# Patient Record
Sex: FEMALE | Race: BLACK OR AFRICAN AMERICAN | NOT HISPANIC OR LATINO | Employment: UNEMPLOYED | URBAN - METROPOLITAN AREA
[De-identification: names, ages, dates, MRNs, and addresses within clinical notes are randomized per-mention and may not be internally consistent; named-entity substitution may affect disease eponyms.]

---

## 2018-03-09 ENCOUNTER — CONVERSION ENCOUNTER (OUTPATIENT)
Dept: ORTHOPEDIC SURGERY | Facility: CLINIC | Age: 58
End: 2018-03-09

## 2018-03-15 ENCOUNTER — OFFICE VISIT CONVERTED (OUTPATIENT)
Dept: ORTHOPEDIC SURGERY | Facility: CLINIC | Age: 58
End: 2018-03-15
Attending: PHYSICIAN ASSISTANT

## 2018-04-09 ENCOUNTER — CONVERSION ENCOUNTER (OUTPATIENT)
Dept: ORTHOPEDIC SURGERY | Facility: CLINIC | Age: 58
End: 2018-04-09

## 2018-04-09 ENCOUNTER — OFFICE VISIT CONVERTED (OUTPATIENT)
Dept: ORTHOPEDIC SURGERY | Facility: CLINIC | Age: 58
End: 2018-04-09
Attending: PHYSICIAN ASSISTANT

## 2018-05-08 ENCOUNTER — OFFICE VISIT CONVERTED (OUTPATIENT)
Dept: ORTHOPEDIC SURGERY | Facility: CLINIC | Age: 58
End: 2018-05-08
Attending: PHYSICIAN ASSISTANT

## 2018-06-06 ENCOUNTER — OFFICE VISIT CONVERTED (OUTPATIENT)
Dept: ORTHOPEDIC SURGERY | Facility: CLINIC | Age: 58
End: 2018-06-06
Attending: ORTHOPAEDIC SURGERY

## 2018-07-26 ENCOUNTER — OFFICE VISIT CONVERTED (OUTPATIENT)
Dept: ORTHOPEDIC SURGERY | Facility: CLINIC | Age: 58
End: 2018-07-26
Attending: ORTHOPAEDIC SURGERY

## 2018-08-21 ENCOUNTER — OFFICE VISIT CONVERTED (OUTPATIENT)
Dept: ORTHOPEDIC SURGERY | Facility: CLINIC | Age: 58
End: 2018-08-21
Attending: ORTHOPAEDIC SURGERY

## 2018-09-25 ENCOUNTER — OFFICE VISIT CONVERTED (OUTPATIENT)
Dept: ORTHOPEDIC SURGERY | Facility: CLINIC | Age: 58
End: 2018-09-25
Attending: ORTHOPAEDIC SURGERY

## 2018-10-09 ENCOUNTER — OFFICE VISIT CONVERTED (OUTPATIENT)
Dept: ORTHOPEDIC SURGERY | Facility: CLINIC | Age: 58
End: 2018-10-09
Attending: ORTHOPAEDIC SURGERY

## 2018-10-30 ENCOUNTER — OFFICE VISIT CONVERTED (OUTPATIENT)
Dept: ORTHOPEDIC SURGERY | Facility: CLINIC | Age: 58
End: 2018-10-30
Attending: ORTHOPAEDIC SURGERY

## 2019-01-10 ENCOUNTER — OFFICE VISIT CONVERTED (OUTPATIENT)
Dept: ORTHOPEDIC SURGERY | Facility: CLINIC | Age: 59
End: 2019-01-10
Attending: ORTHOPAEDIC SURGERY

## 2019-08-06 ENCOUNTER — HOSPITAL ENCOUNTER (OUTPATIENT)
Dept: GENERAL RADIOLOGY | Facility: HOSPITAL | Age: 59
Discharge: HOME | End: 2019-08-06
Attending: FAMILY MEDICINE

## 2020-01-13 ENCOUNTER — HOSPITAL ENCOUNTER (OUTPATIENT)
Dept: NUCLEAR MEDICINE | Facility: HOSPITAL | Age: 60
Discharge: HOME | End: 2020-01-13
Attending: ORTHOPAEDIC SURGERY

## 2020-06-15 ENCOUNTER — HOSPITAL ENCOUNTER (OUTPATIENT)
Dept: PHYSICAL THERAPY | Facility: CLINIC | Age: 60
Setting detail: RECURRING SERIES
Discharge: STILL ADMITTED OR ADMINISTRATIVE | End: 2020-10-29
Attending: ORTHOPAEDIC SURGERY

## 2020-11-03 ENCOUNTER — HOSPITAL ENCOUNTER (OUTPATIENT)
Dept: PHYSICAL THERAPY | Facility: CLINIC | Age: 60
Setting detail: RECURRING SERIES
Discharge: STILL ADMITTED OR ADMINISTRATIVE | End: 2021-02-04
Attending: ORTHOPAEDIC SURGERY

## 2021-02-17 ENCOUNTER — HOSPITAL ENCOUNTER (OUTPATIENT)
Dept: PHYSICAL THERAPY | Facility: CLINIC | Age: 61
Setting detail: RECURRING SERIES
Discharge: HOME | End: 2021-03-16
Attending: ORTHOPAEDIC SURGERY

## 2021-05-16 VITALS
HEIGHT: 63 IN | BODY MASS INDEX: 34.55 KG/M2 | BODY MASS INDEX: 34.73 KG/M2 | BODY MASS INDEX: 34.73 KG/M2 | WEIGHT: 196 LBS | RESPIRATION RATE: 16 BRPM | WEIGHT: 196 LBS | HEIGHT: 63 IN | WEIGHT: 195 LBS | HEIGHT: 63 IN | RESPIRATION RATE: 16 BRPM | RESPIRATION RATE: 16 BRPM

## 2021-05-16 VITALS — RESPIRATION RATE: 16 BRPM | HEIGHT: 63 IN | WEIGHT: 196 LBS | BODY MASS INDEX: 34.73 KG/M2

## 2021-05-16 VITALS — BODY MASS INDEX: 34.73 KG/M2 | RESPIRATION RATE: 16 BRPM | WEIGHT: 196 LBS | HEIGHT: 63 IN

## 2021-05-16 VITALS — RESPIRATION RATE: 16 BRPM | BODY MASS INDEX: 34.73 KG/M2 | WEIGHT: 196 LBS | HEIGHT: 63 IN

## 2021-05-16 VITALS — BODY MASS INDEX: 34.73 KG/M2 | HEART RATE: 16 BPM | HEIGHT: 63 IN | WEIGHT: 196 LBS

## 2021-05-16 VITALS — BODY MASS INDEX: 34.73 KG/M2 | RESPIRATION RATE: 18 BRPM | HEIGHT: 63 IN | WEIGHT: 196 LBS

## 2021-05-16 VITALS — RESPIRATION RATE: 18 BRPM | HEIGHT: 63 IN | WEIGHT: 196 LBS | BODY MASS INDEX: 34.73 KG/M2

## 2021-11-02 ENCOUNTER — TRANSCRIBE ORDERS (OUTPATIENT)
Dept: PHYSICAL THERAPY | Facility: CLINIC | Age: 61
End: 2021-11-02

## 2021-11-02 DIAGNOSIS — Z96.611 HISTORY OF REVISION OF TOTAL REPLACEMENT OF RIGHT SHOULDER JOINT: Primary | ICD-10-CM

## 2021-11-05 ENCOUNTER — TREATMENT (OUTPATIENT)
Dept: PHYSICAL THERAPY | Facility: CLINIC | Age: 61
End: 2021-11-05

## 2021-11-05 ENCOUNTER — TRANSCRIBE ORDERS (OUTPATIENT)
Dept: PHYSICAL THERAPY | Facility: CLINIC | Age: 61
End: 2021-11-05

## 2021-11-05 DIAGNOSIS — Z96.651 STATUS POST REVISION OF TOTAL KNEE REPLACEMENT, RIGHT: Primary | ICD-10-CM

## 2021-11-05 DIAGNOSIS — R26.9 GAIT DISTURBANCE: ICD-10-CM

## 2021-11-05 DIAGNOSIS — M25.661 KNEE STIFFNESS, RIGHT: ICD-10-CM

## 2021-11-05 DIAGNOSIS — Z96.651 S/P REVISION OF TOTAL KNEE, RIGHT: Primary | ICD-10-CM

## 2021-11-05 PROCEDURE — 97162 PT EVAL MOD COMPLEX 30 MIN: CPT | Performed by: PHYSICAL THERAPIST

## 2021-11-05 PROCEDURE — 97140 MANUAL THERAPY 1/> REGIONS: CPT | Performed by: PHYSICAL THERAPIST

## 2021-11-05 PROCEDURE — 97110 THERAPEUTIC EXERCISES: CPT | Performed by: PHYSICAL THERAPIST

## 2021-11-05 NOTE — PROGRESS NOTES
Physical Therapy Initial Evaluation and Plan of Care    Patient: Jaimee Quesada   : 1960  Diagnosis/ICD-10 Code:  S/P revision of total knee, right [Z96.651]  Referring practitioner: Primo Ochoa/Wilfredo Dickerson  Date of Initial Visit: 2021  Today's Date: 2021  Patient seen for 1 sessions           Subjective Questionnaire: LEFS: 11%      Subjective Evaluation    History of Present Illness  Mechanism of injury: Pt is s/p Right total knee revision 10/27/21, she has a CPM at home. Pt has a flight of stairs at home. Ambulating with the walker currently. Pt has been seen in therapy previously for the first total knee.     Pain  Current pain ratin  At best pain ratin  At worst pain ratin  Quality: tight, dull ache and discomfort  Relieving factors: change in position, ice and rest  Aggravating factors: ambulation, movement and standing    Social Support  Lives in: multiple-level home  Lives with: alone    Treatments  Previous treatment: physical therapy  Patient Goals  Patient goals for therapy: decreased edema, decreased pain, improved balance, increased motion, increased strength and independence with ADLs/IADLs             Objective          Active Range of Motion     Right Knee   Flexion: 80 degrees   Extension: Right knee active extension: -10.     Passive Range of Motion     Right Knee   Flexion: 87 degrees   Extension: Right knee passive extension: -8.     Strength/Myotome Testing     Left Knee   Flexion: 5  Extension: 5    Right Knee   Flexion: 3+  Extension: 3+    Ambulation     Comments   Antalgic on RLE with decreased flexion and extension throughout gait      See Exercise, Manual, and Modality Logs for complete treatment.     Assessment & Plan     Assessment  Impairments: abnormal gait, abnormal muscle firing, abnormal or restricted ROM, activity intolerance, impaired balance, impaired physical strength, pain with function and weight-bearing intolerance  Assessment details:  The patient presents to physical therapy with complaints of right knee pain s/p Right total knee revision, still presents with severe stiffness in both flexion and extension. The patient presents with associated knee weakness, stiffness, antalgic gait, and functional deficits (LEFS). The patient would benefit from skilled PT intervention to address the above mentioned functional limitations.     Prognosis: good  Functional Limitations: walking, standing and stooping  Goals  Plan Goals: KNEE PROBLEMS    1. The patient has limited ROM of the right knee.   LTG 1:12 weeks:  The patient will demonstrate 5 to 110 degrees of ROM for the right knee in order to allow patient to ambulate.    STATUS:  New   STG 1a: 6 weeks:  The patient will demonstrate 8 to 10 degrees of ROM for the right knee.    STATUS:  New   TREATMENT: Manual therapy, therapeutic exercise, home exercise instruction, and modalities as needed to include:  moist heat, electrical stimulation, ultrasound, and ice.    2. The patient has limited strength of the right knee.   LTG 2: 12 weeks: The patient will demonstrate 4+ /5 strength for knee flexion and extension in order to allow patient improved joint stability    STATUS:  New   STG 2a: 6 weeks: The patient will demonstrate 4 /5 strength for knee flexion and extension    STATUS:  New    TREATMENT: Manual therapy, therapeutic exercise, home exercise instruction, aquatic therapy, and modalities as needed to include:  moist heat, electrical stimulation, ultrasound, and ice.     3. The patient has gait dysfunction.   LTG 3: 12 weeks:  The patient will ambulate without assistive device, independently, for community distances with normal biomechanics of the right lower extremity in order to improve mobility and allow patient to perform activities such as grocery shopping with greater ease.    STATUS:  New   STG 3a: 4 weeks: The patient will ambulate with a single tipped cane with appropriate gait  mechanics.    STATUS:  New   TREATMENT: Gait training, aquatic therapy, therapeutic exercise, and home exercise instruction.    4. Mobility: Walking/Moving Around Functional Limitation     LTG 4: 12 weeks:  The patient will demonstrate 25 % limitation by achieving a score of 60/80 on the LEFS.    STATUS:  New   STG 4 a: 6 weeks:  The patient will demonstrate 50 % limitation by achieving a score of 40/80 on the LEFS.      STATUS:  New   TREATMENT:  Manual therapy, therapeutic exercise, home exercise instruction, and modalities as needed to include: moist heat, electrical stimulation, and ultrasound.       Plan  Therapy options: will be seen for skilled physical therapy services  Planned modality interventions: TENS, cryotherapy and thermotherapy (hydrocollator packs)  Planned therapy interventions: functional ROM exercises, gait training, home exercise program, manual therapy, strengthening, stretching, therapeutic activities, soft tissue mobilization, joint mobilization, neuromuscular re-education, balance/weight-bearing training and transfer training  Other planned therapy interventions: aquatic therapy  Frequency: 3x week  Duration in weeks: 12  Treatment plan discussed with: patient        Visit Diagnoses:    ICD-10-CM ICD-9-CM   1. 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 Knee stiffness, right  M25.661 719.56   3. Gait disturbance  R26.9 781.2       History # of Personal Factors and/or Comorbidities: HIGH (3+)  Examination of Body System(s): # of elements: MODERATE (3)  Clinical Presentation: STABLE   Clinical Decision Making: MODERATE      Timed:         Manual Therapy:    10     mins  80211;     Therapeutic Exercise:    15     mins  31308;     Neuromuscular Joss:    0    mins  97570;    Therapeutic Activity:     0     mins  85932;     Gait Trainin     mins  80548;     Ultrasound:     0     mins  36859;    Ionto                               0    mins   23553  Self Care                        0     mins   43692  Canalith Repos    0     mins 54300      Un-Timed:  Electrical Stimulation:    0     mins  11420 ( );  Dry Needling     0     mins self-pay  Traction     0     mins 12719  Low Eval     0     Mins  58958  Mod Eval     25     Mins  86273  High Eval                       0     Mins  88210  Re-Eval                           0    mins  53728    Timed Treatment:   25   mins   Total Treatment:     50   mins    PT SIGNATURE: Kurt King PT     Electronically signed 11/5/2021    [place] License: PT - [id number]     Initial Certification  Certification Period: 11/5/2021 thru 2/2/2022  I certify that the therapy services are furnished while this patient is under my care.  The services outlined above are required by this patient, and will be reviewed every 90 days.     PHYSICIAN: Primo Stephen/MD Wilfredo      DATE:     Please sign and return via fax to [phone]. Thank you, [facility].

## 2021-11-09 ENCOUNTER — TREATMENT (OUTPATIENT)
Dept: PHYSICAL THERAPY | Facility: CLINIC | Age: 61
End: 2021-11-09

## 2021-11-09 DIAGNOSIS — M25.661 KNEE STIFFNESS, RIGHT: ICD-10-CM

## 2021-11-09 DIAGNOSIS — R26.9 GAIT DISTURBANCE: ICD-10-CM

## 2021-11-09 DIAGNOSIS — Z96.651 S/P REVISION OF TOTAL KNEE, RIGHT: Primary | ICD-10-CM

## 2021-11-09 PROCEDURE — 97110 THERAPEUTIC EXERCISES: CPT | Performed by: PHYSICAL THERAPIST

## 2021-11-09 PROCEDURE — 97530 THERAPEUTIC ACTIVITIES: CPT | Performed by: PHYSICAL THERAPIST

## 2021-11-09 NOTE — PROGRESS NOTES
Physical Therapy Daily Treatment Note      Patient: Jaimee Quesada   : 1960  Referring practitioner: Primo Ochoa/Wilfredo Arana*  Date of Initial Visit: Type: THERAPY  Noted: 2021  Today's Date: 2021  Patient seen for 2 sessions           Subjective Questionnaire:       Subjective Evaluation    History of Present Illness    Subjective comment: Pt presnts with RW and report of no pain currently.         Objective   See Exercise, Manual, and Modality Logs for complete treatment.       Assessment & Plan     Assessment  Assessment details: Pt's activee R knee flexion on sliding board measured 90 degrees.        Visit Diagnoses:    ICD-10-CM ICD-9-CM   1. 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 Knee stiffness, right  M25.661 719.56   3. Gait disturbance  R26.9 781.2       Progress per Plan of Care and Progress strengthening /stabilization /functional activity           Timed:  Manual Therapy:    12     mins  49087;  Therapeutic Exercise:    17     mins  75748;     Neuromuscular Joss:        mins  00415;    Therapeutic Activity:          mins  09089;     Gait Training:           mins  23605;     Ultrasound:          mins  40450;    Electrical Stimulation:         mins  42626 ( );  Aquatic Therapy          mins  21625    Untimed:  Electrical Stimulation:         mins  31725 ( );  Mechanical Traction:         mins  58587;     Timed Treatment:   29   mins   Total Treatment:     29   mins    Electronically signed    Chanell South PTA  Physical Therapist Assistant    [facility] license: [id number]

## 2021-11-11 ENCOUNTER — TREATMENT (OUTPATIENT)
Dept: PHYSICAL THERAPY | Facility: CLINIC | Age: 61
End: 2021-11-11

## 2021-11-11 DIAGNOSIS — R26.9 GAIT DISTURBANCE: ICD-10-CM

## 2021-11-11 DIAGNOSIS — Z96.651 S/P REVISION OF TOTAL KNEE, RIGHT: Primary | ICD-10-CM

## 2021-11-11 DIAGNOSIS — M25.661 KNEE STIFFNESS, RIGHT: ICD-10-CM

## 2021-11-11 PROCEDURE — 97140 MANUAL THERAPY 1/> REGIONS: CPT | Performed by: PHYSICAL THERAPIST

## 2021-11-11 PROCEDURE — 97110 THERAPEUTIC EXERCISES: CPT | Performed by: PHYSICAL THERAPIST

## 2021-11-11 NOTE — PROGRESS NOTES
"Physical Therapy Daily Treatment Note      Patient: Jaimee Quesada   : 1960  Referring practitioner: Primo Ochoa/Wilfredo Arana*  Date of Initial Visit: Type: THERAPY  Noted: 2021  Today's Date: 2021  Patient seen for 3 sessions           Subjective  Jaimee Quesada reports: \"it is not really pain, it just hurts, soreness.\" \"Got the staples out yesterday.\" \"I have the leg machine that keeps it moving. Doctor said at 60 then go to 90. I was doing it for 2 hours but he told me to go to 1 hr, twice a day.\"       Objective   Pt arrived with STC in R hand  R knee flexion 100-105 hooklying; R knee extension 18-20 hooklying.    See Exercise, Manual, and Modality Logs for complete treatment.       Assessment/Plan  \"My leg feels looser, not as stiff,\" after treatment. Pt requires further skilled care to attend to deficits in her AROM, strength and gross functional ability.  Visit Diagnoses:    ICD-10-CM ICD-9-CM   1. 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 Knee stiffness, right  M25.661 719.56   3. Gait disturbance  R26.9 781.2       Progress per Plan of Care           Timed:  Manual Therapy:    12     mins  74755;  Therapeutic Exercise:    20     mins  01523;     Neuromuscular Joss:        mins  37195;    Therapeutic Activity:          mins  83346;     Gait Trainin     mins  89681;     Ultrasound:          mins  67374;    Electrical Stimulation:         mins  29372 ( );  Aquatics  __   mins   20987    Untimed:  Electrical Stimulation:         mins  43071 ( );  Mechanical Traction:         mins  67930;     Timed Treatment:   40   mins   Total Treatment:     40   mins    Electronically Signed:  Deb Kelly PTA  Physical Therapist Assistant    [place] PTA license [id number]            "

## 2021-11-15 ENCOUNTER — TREATMENT (OUTPATIENT)
Dept: PHYSICAL THERAPY | Facility: CLINIC | Age: 61
End: 2021-11-15

## 2021-11-15 DIAGNOSIS — Z96.651 S/P REVISION OF TOTAL KNEE, RIGHT: Primary | ICD-10-CM

## 2021-11-15 DIAGNOSIS — R26.9 GAIT DISTURBANCE: ICD-10-CM

## 2021-11-15 DIAGNOSIS — M25.661 KNEE STIFFNESS, RIGHT: ICD-10-CM

## 2021-11-15 PROCEDURE — 97110 THERAPEUTIC EXERCISES: CPT | Performed by: PHYSICAL THERAPIST

## 2021-11-15 PROCEDURE — 97140 MANUAL THERAPY 1/> REGIONS: CPT | Performed by: PHYSICAL THERAPIST

## 2021-11-15 NOTE — PROGRESS NOTES
"Physical Therapy Daily Treatment Note      Patient: Jaimee Quesada   : 1960  Referring practitioner: Primo Ochoa/Wilfredo Arana*  Date of Initial Visit: Type: THERAPY  Noted: 2021  Today's Date: 11/15/2021  Patient seen for 4 sessions           Subjective  Jaimee Quesada reports: she is not sleeping well. She produced her bottle that holds medication for muscle spasms, \"just in case I need it while I am here.\" Jaimee also spoke about difficulty getting her diabetes strips.       Objective   Knee Flexion: 100 deg actively after vigorous stretching passively  Knee Extension: 13 deg     See Exercise, Manual, and Modality Logs for complete treatment.       Assessment/Plan   Reviewed quad sets and need for pts compliance with HEP. She is up to 90 degrees flexion with CPM. Skilled care remains needed for flexibility, strength and functional ability.      Visit Diagnoses:    ICD-10-CM ICD-9-CM   1. 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 Knee stiffness, right  M25.661 719.56   3. Gait disturbance  R26.9 781.2       Progress per Plan of Care and Progress strengthening /stabilization /functional activity           Timed:  Manual Therapy:  12       mins  75344;  Therapeutic Exercise:    20     mins  17838;     Neuromuscular Joss:        mins  70383;    Therapeutic Activity:          mins  51384;     Gait Trainin     mins  36870;     Ultrasound:          mins  09266;    Electrical Stimulation:         mins  05162 ( );  Aquatics  __   mins   40951    Untimed:  Electrical Stimulation:         mins  70044 ( );  Mechanical Traction:         mins  82637;     Timed Treatment:   40   mins   Total Treatment:     40   mins    Electronically signed  Deb Kelly PTA  Physical Therapist Assistant  [place] PTA license  [id number]  "

## 2021-11-19 ENCOUNTER — TREATMENT (OUTPATIENT)
Dept: PHYSICAL THERAPY | Facility: CLINIC | Age: 61
End: 2021-11-19

## 2021-11-19 DIAGNOSIS — M25.661 KNEE STIFFNESS, RIGHT: ICD-10-CM

## 2021-11-19 DIAGNOSIS — R26.9 GAIT DISTURBANCE: ICD-10-CM

## 2021-11-19 DIAGNOSIS — Z96.651 S/P REVISION OF TOTAL KNEE, RIGHT: Primary | ICD-10-CM

## 2021-11-19 PROCEDURE — 97110 THERAPEUTIC EXERCISES: CPT | Performed by: PHYSICAL THERAPIST

## 2021-11-19 PROCEDURE — 97530 THERAPEUTIC ACTIVITIES: CPT | Performed by: PHYSICAL THERAPIST

## 2021-11-19 PROCEDURE — 97140 MANUAL THERAPY 1/> REGIONS: CPT | Performed by: PHYSICAL THERAPIST

## 2021-11-19 NOTE — PROGRESS NOTES
Physical Therapy Daily Progress Note    Patient: Jaimee Quesada   : 1960  Diagnosis/ICD-10 Code:  S/P revision of total knee, right [Z96.651]  Referring practitioner: Primo Ochoa/Wilfredo Arana*  Date of Initial Visit: Type: THERAPY  Noted: 2021  Today's Date: 2021  Patient seen for 5 sessions           Subjective Evaluation    History of Present Illness    Subjective comment: Pt reporting she is stiff, but feels like it is improving much better than the last surgery. Pain  Current pain rating: 3           Objective   See Exercise, Manual, and Modality Logs for complete treatment.       Assessment & Plan     Assessment  Assessment details: Pt tolerated today's session well with progression of functional strengthening and ROM. Pt would benefit from continued skilled therapy to address deficits. Progress per plan of care.                 Manual Therapy:    10     mins  80160;  Therapeutic Exercise:    15     mins  35334;     Neuromuscular Joss:    0    mins  00643;    Therapeutic Activity:     10     mins  27922;     Gait Trainin     mins  09874;     Ultrasound:     0     mins  24335;    Electrical Stimulation:    0     mins  66283 ( );  Dry Needling     0     mins self-pay;  Aquatic Therapy    0     mins  51430;  Mechanical Traction    0     mins  95729  Moist Heat     0     mins  No charge    Timed Treatment:   35   mins   Total Treatment:     35   mins    Kurt King PT  Physical Therapist    Electronically signed 2021    [place] License: PT - [id number]

## 2021-11-24 ENCOUNTER — TREATMENT (OUTPATIENT)
Dept: PHYSICAL THERAPY | Facility: CLINIC | Age: 61
End: 2021-11-24

## 2021-11-24 DIAGNOSIS — Z96.651 S/P REVISION OF TOTAL KNEE, RIGHT: Primary | ICD-10-CM

## 2021-11-24 DIAGNOSIS — R26.9 GAIT DISTURBANCE: ICD-10-CM

## 2021-11-24 DIAGNOSIS — M25.661 KNEE STIFFNESS, RIGHT: ICD-10-CM

## 2021-11-24 PROCEDURE — 97530 THERAPEUTIC ACTIVITIES: CPT | Performed by: PHYSICAL THERAPIST

## 2021-11-24 PROCEDURE — 97140 MANUAL THERAPY 1/> REGIONS: CPT | Performed by: PHYSICAL THERAPIST

## 2021-11-24 PROCEDURE — 97110 THERAPEUTIC EXERCISES: CPT | Performed by: PHYSICAL THERAPIST

## 2021-11-24 NOTE — PROGRESS NOTES
Physical Therapy Daily Treatment Note      Patient: Jaimee Quesada   : 1960  Referring practitioner: Primo Ochoa/Wilfredo Arana*  Date of Initial Visit: Type: THERAPY  Noted: 2021  Today's Date: 2021  Patient seen for 6 sessions           Subjective  Jaimee Quesada reports:  She is working hard at home but is still experiencing pain with her efforts.     Objective   Knee Flexion: 101 deg  Knee Extension: 14 deg     See Exercise, Manual, and Modality Logs for complete treatment.       Assessment/Plan    Visit Diagnoses:    ICD-10-CM ICD-9-CM   1. 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 Knee stiffness, right  M25.661 719.56   3. Gait disturbance  R26.9 781.2       Progress per Plan of Care and Progress strengthening /stabilization /functional activity           Timed:  Manual Therapy:   12      mins  25210;  Therapeutic Exercise:    15     mins  99441;     Neuromuscular Joss:        mins  92026;    Therapeutic Activity:     15     mins  48331;     Gait Training:           mins  18853;     Ultrasound:          mins  69774;    Electrical Stimulation:         mins  06167 ( );  Aquatics  __   mins   34786    Untimed:  Electrical Stimulation:         mins  86915 ( );  Mechanical Traction:         mins  21444;     Timed Treatment: 42     mins   Total Treatment:    42    mins    Electronically signed  Deb Kelly PTA  Physical Therapist Assistant  [facility] license  [id number]

## 2021-11-30 ENCOUNTER — TREATMENT (OUTPATIENT)
Dept: PHYSICAL THERAPY | Facility: CLINIC | Age: 61
End: 2021-11-30

## 2021-11-30 DIAGNOSIS — M25.661 KNEE STIFFNESS, RIGHT: ICD-10-CM

## 2021-11-30 DIAGNOSIS — Z96.651 S/P REVISION OF TOTAL KNEE, RIGHT: Primary | ICD-10-CM

## 2021-11-30 DIAGNOSIS — R26.9 GAIT DISTURBANCE: ICD-10-CM

## 2021-11-30 PROCEDURE — 97110 THERAPEUTIC EXERCISES: CPT | Performed by: PHYSICAL THERAPIST

## 2021-11-30 PROCEDURE — 97530 THERAPEUTIC ACTIVITIES: CPT | Performed by: PHYSICAL THERAPIST

## 2021-11-30 PROCEDURE — 97140 MANUAL THERAPY 1/> REGIONS: CPT | Performed by: PHYSICAL THERAPIST

## 2021-11-30 NOTE — PROGRESS NOTES
Physical Therapy Daily Progress Note    Patient: Jaimee Quesada   : 1960  Diagnosis/ICD-10 Code:  S/P revision of total knee, right [Z96.651]  Referring practitioner: Primo Ochoa/Wilfredo Arana*  Date of Initial Visit: Type: THERAPY  Noted: 2021  Today's Date: 2021  Patient seen for 7 sessions           Subjective Evaluation    History of Present Illness    Subjective comment: Pt reporting soreness over the inside part of her knee. Pain  Current pain ratin           Objective   See Exercise, Manual, and Modality Logs for complete treatment.       Assessment & Plan     Assessment    Assessment details: Pt tolerated today's session well with flexion improving, although still very limited with extension. Pt would benefit from continued skilled therapy to address deficits. Progress per plan of care.                 Manual Therapy:    10     mins  08047;  Therapeutic Exercise:    15     mins  60803;     Neuromuscular Joss:    0    mins  18620;    Therapeutic Activity:     10     mins  22953;     Gait Trainin     mins  90590;     Ultrasound:     0     mins  61065;    Electrical Stimulation:    0     mins  10635 ( );  Dry Needling     0     mins self-pay;  Aquatic Therapy    0     mins  35161;  Mechanical Traction    0     mins  96952  Moist Heat     0     mins  No charge    Timed Treatment:   35   mins   Total Treatment:     35   mins    Kurt King PT  Physical Therapist    Electronically signed 2021    [place] License: PT - [id number]

## 2021-12-02 ENCOUNTER — TREATMENT (OUTPATIENT)
Dept: PHYSICAL THERAPY | Facility: CLINIC | Age: 61
End: 2021-12-02

## 2021-12-02 DIAGNOSIS — M25.661 KNEE STIFFNESS, RIGHT: ICD-10-CM

## 2021-12-02 DIAGNOSIS — Z96.651 S/P REVISION OF TOTAL KNEE, RIGHT: Primary | ICD-10-CM

## 2021-12-02 DIAGNOSIS — R26.9 GAIT DISTURBANCE: ICD-10-CM

## 2021-12-02 PROCEDURE — 97530 THERAPEUTIC ACTIVITIES: CPT | Performed by: PHYSICAL THERAPIST

## 2021-12-02 PROCEDURE — 97110 THERAPEUTIC EXERCISES: CPT | Performed by: PHYSICAL THERAPIST

## 2021-12-02 PROCEDURE — 97140 MANUAL THERAPY 1/> REGIONS: CPT | Performed by: PHYSICAL THERAPIST

## 2021-12-02 NOTE — PROGRESS NOTES
Physical Therapy Daily Progress Note    Patient: Jaimee Quesada   : 1960  Diagnosis/ICD-10 Code:  S/P revision of total knee, right [Z96.651]  Referring practitioner: Primo Ochoa/Wilfredo Arana*  Date of Initial Visit: Type: THERAPY  Noted: 2021  Today's Date: 2021  Patient seen for 8 sessions           Subjective Evaluation    History of Present Illness    Subjective comment: Pt reporting she is much more sore and stiff today, she's been pretty sore since the last session. Pain is more over the inside of the knee. She knows it was helpful to stretch, but still very sore.Pain  Current pain ratin           Objective   See Exercise, Manual, and Modality Logs for complete treatment.       Assessment & Plan     Assessment    Assessment details: Pt tolerated today's session well, continued emphasis on ROM activities. Pt would benefit from continued skilled therapy to address deficits. Progress per plan of care.                 Manual Therapy:    10     mins  40025;  Therapeutic Exercise:    18     mins  94199;     Neuromuscular Joss:    0    mins  61599;    Therapeutic Activity:     10     mins  45942;     Gait Trainin     mins  61451;     Ultrasound:     0     mins  40483;    Electrical Stimulation:    0     mins  15914 ( );  Dry Needling     0     mins self-pay;  Aquatic Therapy    0     mins  30939;  Mechanical Traction    0     mins  60726  Moist Heat     0     mins  No charge    Timed Treatment:   38   mins   Total Treatment:     38   mins    Kurt King PT  Physical Therapist    Electronically signed 2021    [place] License: PT - [id number]

## 2021-12-07 ENCOUNTER — TREATMENT (OUTPATIENT)
Dept: PHYSICAL THERAPY | Facility: CLINIC | Age: 61
End: 2021-12-07

## 2021-12-07 DIAGNOSIS — M25.661 KNEE STIFFNESS, RIGHT: ICD-10-CM

## 2021-12-07 DIAGNOSIS — R26.9 GAIT DISTURBANCE: ICD-10-CM

## 2021-12-07 DIAGNOSIS — Z96.651 S/P REVISION OF TOTAL KNEE, RIGHT: Primary | ICD-10-CM

## 2021-12-07 PROCEDURE — 97110 THERAPEUTIC EXERCISES: CPT | Performed by: PHYSICAL THERAPIST

## 2021-12-07 PROCEDURE — 97140 MANUAL THERAPY 1/> REGIONS: CPT | Performed by: PHYSICAL THERAPIST

## 2021-12-07 NOTE — PROGRESS NOTES
"Physical Therapy Daily Treatment Note      Patient: Jaimee Quesada   : 1960  Referring practitioner: Primo Ochoa/Wilfredo Arana*  Date of Initial Visit: Type: THERAPY  Noted: 2021  Today's Date: 2021  Patient seen for 9 sessions           Subjective  Jaimee Quesada reports: pt stated she doesn't like the bike PTA put her on. \"Not pain but stiff. Not using CPM any longer.\"  \"See the doctor 21.\"    Objective   See Exercise, Manual, and Modality Logs for complete treatment.       Assessment/Plan  Jaimee continues to progress with AROM, strength and functional ability yet has further to go to attain LTG's. Therapist directed program remains necessary as outlined.    Visit Diagnoses:    ICD-10-CM ICD-9-CM   1. 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 Knee stiffness, right  M25.661 719.56   3. Gait disturbance  R26.9 781.2       Continue per POC.           Timed:  Manual Therapy:    10     mins  24327;  Therapeutic Exercise:    20     mins  18157;     Neuromuscular Joss:        mins  37064;    Therapeutic Activity:          mins  18356;     Gait Training:           mins  12268;     Ultrasound:          mins  23634;    Electrical Stimulation:         mins  00073 ( );  Aquatics  __   mins   78625    Untimed:  Electrical Stimulation:         mins  22953 ( );  Mechanical Traction:         mins  60756;     Timed Treatment:   30   mins   Total Treatment:     30   mins    Electronically Signed:  Deb Kelly PTA  Physical Therapist Assistant    [facility] license [id number]            "

## 2021-12-09 ENCOUNTER — TREATMENT (OUTPATIENT)
Dept: PHYSICAL THERAPY | Facility: CLINIC | Age: 61
End: 2021-12-09

## 2021-12-09 DIAGNOSIS — M25.661 KNEE STIFFNESS, RIGHT: ICD-10-CM

## 2021-12-09 DIAGNOSIS — Z96.651 S/P REVISION OF TOTAL KNEE, RIGHT: Primary | ICD-10-CM

## 2021-12-09 DIAGNOSIS — R26.9 GAIT DISTURBANCE: ICD-10-CM

## 2021-12-09 PROCEDURE — 97110 THERAPEUTIC EXERCISES: CPT | Performed by: PHYSICAL THERAPIST

## 2021-12-09 PROCEDURE — 97140 MANUAL THERAPY 1/> REGIONS: CPT | Performed by: PHYSICAL THERAPIST

## 2021-12-09 NOTE — PROGRESS NOTES
Progress Assessment        Patient: Jaimee Quesada   : 1960  Diagnosis/ICD-10 Code:  S/P revision of total knee, right [Z96.651]  Referring practitioner: Primo Ochoa/Wilfredo Arana*  Date of Initial Visit: Type: THERAPY  Noted: 2021  Today's Date: 2021  Patient seen for 10 sessions      Subjective:     Subjective Questionnaire: LEFS:   Clinical Progress: improved  Home Program Compliance: Yes  Treatment has included: therapeutic exercise, manual therapy and therapeutic activity    Subjective Evaluation    History of Present Illness  Mechanism of injury: Pt reporting she is still feeling very stiff. She just changed insulin medications and feels this is making her more fatigued in general. The weather, too, she says is making her more stiff. Although, she is still trying to stretch at home as well.     Pain  Current pain ratin         Objective   Active Range of Motion      Right Knee   Flexion: 98 degrees (compared to 80 degrees at eval)   Extension: Right knee active extension: -10.      Passive Range of Motion      Right Knee   Flexion: 110 degrees (compared to 87 degrees  At eval)  Extension: Right knee passive extension: -8.      Strength/Myotome Testing      Left Knee   Flexion: 5  Extension: 5     Right Knee   Flexion: 4  Extension: 4     Ambulation      Comments   Antalgic on RLE with decreased flexion and extension throughout gait      Assessment & Plan     Assessment  Impairments: abnormal gait, abnormal muscle firing, abnormal or restricted ROM, activity intolerance, impaired balance, impaired physical strength, pain with function and weight-bearing intolerance  Functional Limitations: walking, standing and stooping  Assessment details: The patient presents to physical therapy with complaints of right knee pain s/p Right total knee revision, still presents with severe stiffness in both flexion and extension, although she has progressed well with flexion.  She is currently  most limited with extension. She has met two additional goals set at the evaluation beyond the flexion ROM goals.  The patient presents with associated knee weakness, stiffness, antalgic gait, and functional deficits (LEFS). The patient would benefit from skilled PT intervention to address the above mentioned functional limitations.     Prognosis: good    Goals  Plan Goals: KNEE PROBLEMS    1. The patient has limited ROM of the right knee.   LTG 1:12 weeks:  The patient will demonstrate 5 to 110 degrees of ROM for the right knee in order to allow patient to ambulate.    STATUS:  MET for flexion, not for extension   STG 1a: 6 weeks:  The patient will demonstrate 8 to 100 degrees of ROM for the right knee.    STATUS:  MET for flexion, not for extension   TREATMENT: Manual therapy, therapeutic exercise, home exercise instruction, and modalities as needed to include:  moist heat, electrical stimulation, ultrasound, and ice.    2. The patient has limited strength of the right knee.   LTG 2: 12 weeks: The patient will demonstrate 4+ /5 strength for knee flexion and extension in order to allow patient improved joint stability    STATUS:  Not met   STG 2a: 6 weeks: The patient will demonstrate 4 /5 strength for knee flexion and extension    STATUS:  MET   TREATMENT: Manual therapy, therapeutic exercise, home exercise instruction, aquatic therapy, and modalities as needed to include:  moist heat, electrical stimulation, ultrasound, and ice.     3. The patient has gait dysfunction.   LTG 3: 12 weeks:  The patient will ambulate without assistive device, independently, for community distances with normal biomechanics of the right lower extremity in order to improve mobility and allow patient to perform activities such as grocery shopping with greater ease.    STATUS:  Not met   STG 3a: 4 weeks: The patient will ambulate with a single tipped cane with appropriate gait mechanics.    STATUS:  MET   TREATMENT: Gait training,  aquatic therapy, therapeutic exercise, and home exercise instruction.    4. Mobility: Walking/Moving Around Functional Limitation     LTG 4: 12 weeks:  The patient will demonstrate 25 % limitation by achieving a score of 60/80 on the LEFS.    STATUS:  Not met   STG 4 a: 6 weeks:  The patient will demonstrate 50 % limitation by achieving a score of 40/80 on the LEFS.      STATUS:  Not met   TREATMENT:  Manual therapy, therapeutic exercise, home exercise instruction, and modalities as needed to include: moist heat, electrical stimulation, and ultrasound.       Plan  Therapy options: will be seen for skilled therapy services  Planned modality interventions: TENS, cryotherapy and thermotherapy (hydrocollator packs)  Planned therapy interventions: functional ROM exercises, gait training, home exercise program, manual therapy, strengthening, stretching, therapeutic activities, soft tissue mobilization, joint mobilization, neuromuscular re-education, balance/weight-bearing training and transfer training  Other planned therapy interventions: aquatic therapy  Frequency: 3x week  Duration in weeks: 12  Treatment plan discussed with: patient      Progress toward previous goals: Partially Met    See Exercise, Manual, and Modality Logs for complete treatment.         Recommendations: Continue as planned  Timeframe: 2 months  Prognosis to achieve goals: good    PT Signature: Kurt King PT    Electronically signed 12/9/2021    [place] License: PT - [id number]     Based upon review of the patient's progress and continued therapy plan, it is my medical opinion that Jaimee Quesada should continue physical therapy treatment at [facility] PHYSICAL THERAPY  [address]  [place] [address] [phone].      Timed:         Manual Therapy:    10     mins  24128;     Therapeutic Exercise:    20     mins  52574;     Neuromuscular Joss:    0    mins  32356;    Therapeutic Activity:     0     mins  64402;      Gait Trainin     mins  71228;     Ultrasound:     0     mins  30377;    Ionto                               0    mins   03031  Self Care                       0     mins   59511  Aquatic                          0     mins 36921          Timed Treatment:   30   mins   Total Treatment:     30   mins      I certify that the therapy services are furnished while this patient is under my care.  The services outlined above are required by this patient, and will be reviewed every 90 days.

## 2021-12-14 ENCOUNTER — TREATMENT (OUTPATIENT)
Dept: PHYSICAL THERAPY | Facility: CLINIC | Age: 61
End: 2021-12-14

## 2021-12-14 DIAGNOSIS — M25.661 KNEE STIFFNESS, RIGHT: ICD-10-CM

## 2021-12-14 DIAGNOSIS — R26.9 GAIT DISTURBANCE: ICD-10-CM

## 2021-12-14 DIAGNOSIS — Z96.651 S/P REVISION OF TOTAL KNEE, RIGHT: Primary | ICD-10-CM

## 2021-12-14 PROCEDURE — 97140 MANUAL THERAPY 1/> REGIONS: CPT | Performed by: PHYSICAL THERAPIST

## 2021-12-14 PROCEDURE — 97530 THERAPEUTIC ACTIVITIES: CPT | Performed by: PHYSICAL THERAPIST

## 2021-12-14 PROCEDURE — 97110 THERAPEUTIC EXERCISES: CPT | Performed by: PHYSICAL THERAPIST

## 2021-12-14 NOTE — PROGRESS NOTES
Physical Therapy Daily Progress Note    Patient: Jaimee Quesada   : 1960  Diagnosis/ICD-10 Code:  S/P revision of total knee, right [Z96.651]  Referring practitioner: Primo Ochoa/Wilfredo Arana*  Date of Initial Visit: Type: THERAPY  Noted: 2021  Today's Date: 2021  Patient seen for 11 sessions           Subjective Evaluation    History of Present Illness    Subjective comment: Pt reporting she is stiff this morning, but doing a little better overall.       Objective   See Exercise, Manual, and Modality Logs for complete treatment.       Assessment & Plan     Assessment    Assessment details: Pt tolerated today's session well, flexion steadily progressing with more limitations in extension. Pt would benefit from continued skilled therapy to address deficits. Progress per plan of care.                 Manual Therapy:    10     mins  69938;  Therapeutic Exercise:    15     mins  54195;     Neuromuscular Joss:    0    mins  68468;    Therapeutic Activity:     15     mins  52709;     Gait Trainin     mins  65673;     Ultrasound:     0     mins  77076;    Electrical Stimulation:    0     mins  46548 ( );  Dry Needling     0     mins self-pay;  Aquatic Therapy    0     mins  60902;  Mechanical Traction    0     mins  20667  Moist Heat     0     mins  No charge    Timed Treatment:   40   mins   Total Treatment:     40   mins    Kurt King PT  Physical Therapist    Electronically signed 2021    [place] License: PT - [id number]

## 2021-12-23 ENCOUNTER — TREATMENT (OUTPATIENT)
Dept: PHYSICAL THERAPY | Facility: CLINIC | Age: 61
End: 2021-12-23

## 2021-12-23 DIAGNOSIS — Z96.651 S/P REVISION OF TOTAL KNEE, RIGHT: Primary | ICD-10-CM

## 2021-12-23 DIAGNOSIS — M25.661 KNEE STIFFNESS, RIGHT: ICD-10-CM

## 2021-12-23 DIAGNOSIS — R26.9 GAIT DISTURBANCE: ICD-10-CM

## 2021-12-23 PROCEDURE — 97140 MANUAL THERAPY 1/> REGIONS: CPT | Performed by: PHYSICAL THERAPIST

## 2021-12-23 PROCEDURE — 97110 THERAPEUTIC EXERCISES: CPT | Performed by: PHYSICAL THERAPIST

## 2021-12-23 NOTE — PROGRESS NOTES
Physical Therapy Daily Progress Note    Patient: Jaimee Quesada   : 1960  Diagnosis/ICD-10 Code:  S/P revision of total knee, right [Z96.651]  Referring practitioner: Primo Ochoa/Wilfredo Arana*  Date of Initial Visit: Type: THERAPY  Noted: 2021  Today's Date: 2021  Patient seen for 12 sessions           Subjective Evaluation    History of Present Illness    Subjective comment: Pt reporting she is having a lot of tightness in her knee. Physician appt went ok, she was told she would undergo another manipulation in the beginning of January.Pain  Current pain ratin           Objective   See Exercise, Manual, and Modality Logs for complete treatment.       Assessment & Plan     Assessment    Assessment details: Pt tolerated today's session well, focused on patellar mobility today. Pt has one more visit before she will undergo the manipulation. Pt would benefit from continued skilled therapy to address deficits. Progress per plan of care.                 Manual Therapy:    12     mins  67419;  Therapeutic Exercise:    16     mins  50770;     Neuromuscular Joss:    0    mins  18607;    Therapeutic Activity:     0     mins  41548;     Gait Trainin     mins  49471;     Ultrasound:     0     mins  22010;    Electrical Stimulation:    0     mins  91032 ( );  Dry Needling     0     mins self-pay;  Aquatic Therapy    0     mins  57421;  Mechanical Traction    0     mins  30326  Moist Heat     0     mins  No charge    Timed Treatment:   28   mins   Total Treatment:     28   mins    Kurt King PT  Physical Therapist    Electronically signed 2021    [place] License: PT - [id number]

## 2021-12-28 ENCOUNTER — TELEPHONE (OUTPATIENT)
Dept: PHYSICAL THERAPY | Facility: CLINIC | Age: 61
End: 2021-12-28

## 2022-01-03 ENCOUNTER — TREATMENT (OUTPATIENT)
Dept: PHYSICAL THERAPY | Facility: CLINIC | Age: 62
End: 2022-01-03

## 2022-01-03 DIAGNOSIS — R26.9 GAIT DISTURBANCE: ICD-10-CM

## 2022-01-03 DIAGNOSIS — M25.661 KNEE STIFFNESS, RIGHT: ICD-10-CM

## 2022-01-03 DIAGNOSIS — Z96.651 S/P REVISION OF TOTAL KNEE, RIGHT: Primary | ICD-10-CM

## 2022-01-03 PROCEDURE — 97164 PT RE-EVAL EST PLAN CARE: CPT | Performed by: PHYSICAL THERAPIST

## 2022-01-03 PROCEDURE — 97110 THERAPEUTIC EXERCISES: CPT | Performed by: PHYSICAL THERAPIST

## 2022-01-03 PROCEDURE — 97140 MANUAL THERAPY 1/> REGIONS: CPT | Performed by: PHYSICAL THERAPIST

## 2022-01-03 NOTE — PROGRESS NOTES
Progress Assessment        Patient: Jaimee Quesada   : 1960  Diagnosis/ICD-10 Code:  S/P revision of total knee, right [Z96.651]  Referring practitioner: No ref. provider found  Date of Initial Visit: Type: THERAPY  Noted: 2021  Today's Date: 1/3/2022  Patient seen for 13 sessions      Subjective:     Subjective Questionnaire: LEFS:   Clinical Progress: improved  Home Program Compliance: Yes  Treatment has included: therapeutic exercise, manual therapy, therapeutic activity and gait training    Subjective Evaluation    History of Present Illness  Mechanism of injury: Pt underwent a manipulation on the right knee this morning, she presents to therapy for ROM and strengthening post manipulation.     Pain  Current pain ratin         Objective   Active Range of Motion      Right Knee   Flexion: 102 degrees  Extension: Right knee active extension: -10.      Passive Range of Motion      Right Knee   Flexion: 110 degrees   Extension: Right knee passive extension: -6.      Strength/Myotome Testing      Left Knee   Flexion: 5  Extension: 5     Right Knee   Flexion: 4  Extension: 4     Ambulation      Comments   Antalgic on RLE with decreased flexion and extension throughout gait      Assessment & Plan     Assessment  Impairments: abnormal gait, abnormal muscle firing, abnormal or restricted ROM, activity intolerance, impaired balance, impaired physical strength, pain with function and weight-bearing intolerance  Functional Limitations: walking, standing and stooping  Assessment details: The patient presents to physical therapy with complaints of right knee pain s/p Right total knee manipulation this morning 1/3/22. The patient presents with associated knee weakness, stiffness, antalgic gait, and functional deficits (LEFS). The patient would benefit from skilled PT intervention to address the above mentioned functional limitations.     Prognosis: good    Goals  Plan Goals: KNEE PROBLEMS    1. The  patient has limited ROM of the right knee.   LTG 1:12 weeks:  The patient will demonstrate 5 to 110 degrees of ROM for the right knee in order to allow patient to ambulate.    STATUS:  MET for flexion, not for extension   STG 1a: 6 weeks:  The patient will demonstrate 8 to 100 degrees of ROM for the right knee.    STATUS:  MET for flexion, not for extension   TREATMENT: Manual therapy, therapeutic exercise, home exercise instruction, and modalities as needed to include:  moist heat, electrical stimulation, ultrasound, and ice.    2. The patient has limited strength of the right knee.   LTG 2: 12 weeks: The patient will demonstrate 4+ /5 strength for knee flexion and extension in order to allow patient improved joint stability    STATUS:  Not met   STG 2a: 6 weeks: The patient will demonstrate 4 /5 strength for knee flexion and extension    STATUS:  MET   TREATMENT: Manual therapy, therapeutic exercise, home exercise instruction, aquatic therapy, and modalities as needed to include:  moist heat, electrical stimulation, ultrasound, and ice.     3. The patient has gait dysfunction.   LTG 3: 12 weeks:  The patient will ambulate without assistive device, independently, for community distances with normal biomechanics of the right lower extremity in order to improve mobility and allow patient to perform activities such as grocery shopping with greater ease.    STATUS:  Not met   STG 3a: 4 weeks: The patient will ambulate with a single tipped cane with appropriate gait mechanics.    STATUS:  MET   TREATMENT: Gait training, aquatic therapy, therapeutic exercise, and home exercise instruction.    4. Mobility: Walking/Moving Around Functional Limitation     LTG 4: 12 weeks:  The patient will demonstrate 25 % limitation by achieving a score of 60/80 on the LEFS.    STATUS:  Not met   STG 4 a: 6 weeks:  The patient will demonstrate 50 % limitation by achieving a score of 40/80 on the LEFS.      STATUS:  Not met   TREATMENT:   Manual therapy, therapeutic exercise, home exercise instruction, and modalities as needed to include: moist heat, electrical stimulation, and ultrasound.       Plan  Therapy options: will be seen for skilled therapy services  Planned modality interventions: TENS, cryotherapy and thermotherapy (hydrocollator packs)  Planned therapy interventions: functional ROM exercises, gait training, home exercise program, manual therapy, strengthening, stretching, therapeutic activities, soft tissue mobilization, joint mobilization, neuromuscular re-education, balance/weight-bearing training and transfer training  Other planned therapy interventions: aquatic therapy  Frequency: 3x week  Duration in weeks: 12  Treatment plan discussed with: patient      Progress toward previous goals: Partially Met    See Exercise, Manual, and Modality Logs for complete treatment.         Recommendations: Continue as planned  Timeframe: 2 months  Prognosis to achieve goals: good    PT Signature: Kurt King PT    Electronically signed 1/3/2022    [place] License: PT - [id number]     Based upon review of the patient's progress and continued therapy plan, it is my medical opinion that Jaimee Quesada should continue physical therapy treatment at [facility] THERAP[facility]  [address]  [place] [facility] [place] [address] [phone].      Timed:         Manual Therapy:    15     mins  63723;     Therapeutic Exercise:    15     mins  23821;     Neuromuscular Joss:    0    mins  68100;    Therapeutic Activity:     0     mins  82981;     Gait Trainin     mins  04442;     Ultrasound:     0     mins  15734;    Ionto                               0    mins   99687  Self Care                       0     mins   95208  Aquatic                          0     mins 33259      Un-Timed:  Electrical Stimulation:    0     mins  31960 ( );  Dry Needling     0     mins self-pay  Traction     0     mins 75608  Low Eval     0     Mins   79514  Mod Eval     0     Mins  34496  High Eval                       0     Mins  12323  Re-Eval                           15    mins  14147      Timed Treatment:   30   mins   Total Treatment:     45   mins      I certify that the therapy services are furnished while this patient is under my care.  The services outlined above are required by this patient, and will be reviewed every 90 days.

## 2022-01-04 ENCOUNTER — TREATMENT (OUTPATIENT)
Dept: PHYSICAL THERAPY | Facility: CLINIC | Age: 62
End: 2022-01-04

## 2022-01-04 DIAGNOSIS — R26.9 GAIT DISTURBANCE: ICD-10-CM

## 2022-01-04 DIAGNOSIS — M25.661 KNEE STIFFNESS, RIGHT: ICD-10-CM

## 2022-01-04 DIAGNOSIS — Z96.651 S/P REVISION OF TOTAL KNEE, RIGHT: Primary | ICD-10-CM

## 2022-01-04 PROCEDURE — 97140 MANUAL THERAPY 1/> REGIONS: CPT | Performed by: PHYSICAL THERAPIST

## 2022-01-04 PROCEDURE — 97110 THERAPEUTIC EXERCISES: CPT | Performed by: PHYSICAL THERAPIST

## 2022-01-04 PROCEDURE — 97530 THERAPEUTIC ACTIVITIES: CPT | Performed by: PHYSICAL THERAPIST

## 2022-01-04 NOTE — PROGRESS NOTES
Physical Therapy Daily Progress Note    Patient: Jaimee Quesada   : 1960  Diagnosis/ICD-10 Code:  S/P revision of total knee, right [Z96.651]  Referring practitioner: Primo Ochoa/Wilfredo Arana*  Date of Initial Visit: Type: THERAPY  Noted: 2021  Today's Date: 2022  Patient seen for 14 sessions           Subjective Evaluation    History of Present Illness    Subjective comment: Pt reporting pain 5/10 today, very tight. Pain  Current pain ratin           Objective   See Exercise, Manual, and Modality Logs for complete treatment.       Assessment & Plan     Assessment    Assessment details: Pt tolerated today's session well, continued progression of ROM and mobility. Pt would benefit from continued skilled therapy to address deficits. Progress per plan of care.                 Manual Therapy:    15     mins  05073;  Therapeutic Exercise:    10     mins  31118;     Neuromuscular Joss:    0    mins  25135;    Therapeutic Activity:     10     mins  44907;     Gait Trainin     mins  00960;     Ultrasound:     0     mins  52326;    Electrical Stimulation:    0     mins  50042 ( );  Dry Needling     0     mins self-pay;  Aquatic Therapy    0     mins  51806;  Mechanical Traction    0     mins  59230  Moist Heat     0     mins  No charge    Timed Treatment:   35   mins   Total Treatment:     35   mins    Kurt King PT  Physical Therapist    Electronically signed 2022    [place] License: PT - [id number]

## 2022-01-05 ENCOUNTER — TREATMENT (OUTPATIENT)
Dept: PHYSICAL THERAPY | Facility: CLINIC | Age: 62
End: 2022-01-05

## 2022-01-05 DIAGNOSIS — M25.661 KNEE STIFFNESS, RIGHT: ICD-10-CM

## 2022-01-05 DIAGNOSIS — Z96.651 S/P REVISION OF TOTAL KNEE, RIGHT: Primary | ICD-10-CM

## 2022-01-05 DIAGNOSIS — R26.9 GAIT DISTURBANCE: ICD-10-CM

## 2022-01-05 PROCEDURE — 97110 THERAPEUTIC EXERCISES: CPT | Performed by: PHYSICAL THERAPIST

## 2022-01-05 PROCEDURE — 97140 MANUAL THERAPY 1/> REGIONS: CPT | Performed by: PHYSICAL THERAPIST

## 2022-01-05 NOTE — PROGRESS NOTES
Physical Therapy Daily Progress Note        Patient: Jaimee Quesada   : 1960  Diagnosis/ICD-10 Code:  S/P revision of total knee, right [Z96.651]  Referring practitioner: No ref. provider found  Date of Initial Visit: Type: THERAPY  Noted: 2021  Today's Date: 2022  Patient seen for 15 sessions             Subjective   Jaimee Quesada reports: being a little stiff and sore today following PT session yesterday.     Objective   Knee Flexion: 110deg c overpressure   Knee Extension: -6deg c heel prop    See Exercise, Manual, and Modality Logs for complete treatment.       Assessment/Plan  Jaimee still experiencing increased R knee pain, even a little stiff today. Pt had some increased discomfort with end range PROM. Pt able to achieve -6 deg of knee extension and 110 deg of knee flexion. Pt would benefit from skilled PT to address Range of Motion  and Strength deficits, pain management and any concerns with ADLs.     Progress per Plan of Care           Timed:  Manual Therapy:    15     mins  81636;  Therapeutic Exercise:    15     mins  19606;     Neuromuscular Joss:        mins  90910;    Therapeutic Activity:          mins  05872;     Gait Training:           mins  77113;    Aquatic Therapy:          mins  21538;       Untimed:  Electrical Stimulation:         mins  51621 ( );  Mechanical Traction:         mins  08816;       Timed Treatment:   30   mins   Total Treatment:     30   mins      Electronically signed:   Tammi King PTA  Physical Therapist Assistant  [facility] License #: [id number]

## 2022-01-11 ENCOUNTER — TREATMENT (OUTPATIENT)
Dept: PHYSICAL THERAPY | Facility: CLINIC | Age: 62
End: 2022-01-11

## 2022-01-11 DIAGNOSIS — Z96.651 S/P REVISION OF TOTAL KNEE, RIGHT: Primary | ICD-10-CM

## 2022-01-11 DIAGNOSIS — R26.9 GAIT DISTURBANCE: ICD-10-CM

## 2022-01-11 DIAGNOSIS — M25.661 KNEE STIFFNESS, RIGHT: ICD-10-CM

## 2022-01-11 PROCEDURE — 97140 MANUAL THERAPY 1/> REGIONS: CPT | Performed by: PHYSICAL THERAPIST

## 2022-01-11 PROCEDURE — 97530 THERAPEUTIC ACTIVITIES: CPT | Performed by: PHYSICAL THERAPIST

## 2022-01-11 PROCEDURE — 97110 THERAPEUTIC EXERCISES: CPT | Performed by: PHYSICAL THERAPIST

## 2022-01-11 NOTE — PROGRESS NOTES
Physical Therapy Daily Progress Note    Patient: Jaimee Quesada   : 1960  Diagnosis/ICD-10 Code:  S/P revision of total knee, right [Z96.651]  Referring practitioner: No ref. provider found  Date of Initial Visit: Type: THERAPY  Noted: 2021  Today's Date: 2022  Patient seen for 16 sessions           Subjective Evaluation    History of Present Illness    Subjective comment: Pt reporting she is having more pain/soreness today. Pain  Current pain ratin           Objective   See Exercise, Manual, and Modality Logs for complete treatment.       Assessment & Plan     Assessment    Assessment details: Pt tolerated today's session well, progressed to 113 degrees PROM this visit. Updated HEP to include more flexion stretching. Pt is not doing therapy on certain days with other appts (ie eye doctor on  and dentist next Monday). Pt would benefit from continued skilled therapy to address deficits. Progress per plan of care.                 Manual Therapy:    12     mins  60506;  Therapeutic Exercise:    10     mins  11880;     Neuromuscular Joss:    0    mins  63076;    Therapeutic Activity:     8     mins  98997;     Gait Trainin     mins  92209;     Ultrasound:     0     mins  73354;    Electrical Stimulation:    0     mins  66132 ( );  Dry Needling     0     mins self-pay;  Aquatic Therapy    0     mins  41659;  Mechanical Traction    0     mins  62647  Moist Heat     0     mins  No charge    Timed Treatment:   30   mins   Total Treatment:     30   mins    Kurt King PT  Physical Therapist    Electronically signed 2022    [place] License: PT - [id number]

## 2022-01-12 ENCOUNTER — TREATMENT (OUTPATIENT)
Dept: PHYSICAL THERAPY | Facility: CLINIC | Age: 62
End: 2022-01-12

## 2022-01-12 DIAGNOSIS — M25.661 KNEE STIFFNESS, RIGHT: ICD-10-CM

## 2022-01-12 DIAGNOSIS — Z96.651 S/P REVISION OF TOTAL KNEE, RIGHT: Primary | ICD-10-CM

## 2022-01-12 DIAGNOSIS — R26.9 GAIT DISTURBANCE: ICD-10-CM

## 2022-01-12 PROCEDURE — 97110 THERAPEUTIC EXERCISES: CPT | Performed by: PHYSICAL THERAPIST

## 2022-01-12 PROCEDURE — 97140 MANUAL THERAPY 1/> REGIONS: CPT | Performed by: PHYSICAL THERAPIST

## 2022-01-12 PROCEDURE — 97530 THERAPEUTIC ACTIVITIES: CPT | Performed by: PHYSICAL THERAPIST

## 2022-01-12 NOTE — PROGRESS NOTES
Physical Therapy Daily Progress Note    Patient: Jaimee Quesada   : 1960  Diagnosis/ICD-10 Code:  S/P revision of total knee, right [Z96.651]  Referring practitioner: No ref. provider found  Date of Initial Visit: Type: THERAPY  Noted: 2021  Today's Date: 2022  Patient seen for 17 sessions           Subjective Evaluation    History of Present Illness    Subjective comment: Pt reporting she wasn't too sore from yesterday, mainly having soreness on the outside. Pain  Current pain ratin           Objective   See Exercise, Manual, and Modality Logs for complete treatment.       Assessment & Plan     Assessment    Assessment details: Pt tolerated today's session well with progression of ROM activities, she is still progressing well with flexion. Pt would benefit from continued skilled therapy to address deficits. Progress per plan of care.                 Manual Therapy:    10     mins  25001;  Therapeutic Exercise:    15     mins  82171;     Neuromuscular Joss:    0    mins  48330;    Therapeutic Activity:     10     mins  18908;     Gait Trainin     mins  96639;     Ultrasound:     0     mins  00316;    Electrical Stimulation:    0     mins  52612 ( );  Dry Needling     0     mins self-pay;  Aquatic Therapy    0     mins  84564;  Mechanical Traction    0     mins  03455  Moist Heat     0     mins  No charge    Timed Treatment:   35   mins   Total Treatment:     35   mins    Kurt King PT  Physical Therapist    Electronically signed 2022    [place] License: PT - [id number]

## 2022-01-14 ENCOUNTER — TREATMENT (OUTPATIENT)
Dept: PHYSICAL THERAPY | Facility: CLINIC | Age: 62
End: 2022-01-14

## 2022-01-14 DIAGNOSIS — Z96.651 S/P REVISION OF TOTAL KNEE, RIGHT: Primary | ICD-10-CM

## 2022-01-14 DIAGNOSIS — R26.9 GAIT DISTURBANCE: ICD-10-CM

## 2022-01-14 DIAGNOSIS — M25.661 KNEE STIFFNESS, RIGHT: ICD-10-CM

## 2022-01-14 PROCEDURE — 97140 MANUAL THERAPY 1/> REGIONS: CPT | Performed by: PHYSICAL THERAPIST

## 2022-01-14 PROCEDURE — 97530 THERAPEUTIC ACTIVITIES: CPT | Performed by: PHYSICAL THERAPIST

## 2022-01-14 PROCEDURE — 97110 THERAPEUTIC EXERCISES: CPT | Performed by: PHYSICAL THERAPIST

## 2022-01-14 NOTE — PROGRESS NOTES
Physical Therapy Daily Progress Note    Patient: Jaimee Quesada   : 1960  Diagnosis/ICD-10 Code:  S/P revision of total knee, right [Z96.651]  Referring practitioner: Primo Ochoa/Wilfredo Arana*  Date of Initial Visit: Type: THERAPY  Noted: 2021  Today's Date: 2022  Patient seen for 18 sessions           Subjective Evaluation    History of Present Illness    Subjective comment: Pt reporting she is feeling ok today, she had a procedure for her eyes yesterday that went well. Pain  Current pain rating: 3           Objective   See Exercise, Manual, and Modality Logs for complete treatment.       Assessment & Plan     Assessment    Assessment details: Pt tolerated today's session well, progressed with exercises and ROM today. She is achieving better ROM in both flexion and extension. Pt would benefit from continued skilled therapy to address deficits. Progress per plan of care.                 Manual Therapy:    12     mins  40419;  Therapeutic Exercise:    15     mins  79554;     Neuromuscular Joss:    0    mins  60612;    Therapeutic Activity:     10     mins  79118;     Gait Trainin     mins  23722;     Ultrasound:     0     mins  09093;    Electrical Stimulation:    0     mins  32832 ( );  Dry Needling     0     mins self-pay;  Aquatic Therapy    0     mins  81107;  Mechanical Traction    0     mins  16639  Moist Heat     0     mins  No charge    Timed Treatment:   37   mins   Total Treatment:     37   mins    Kurt King PT  Physical Therapist    Electronically signed 2022    [place] License: PT - [id number]

## 2022-01-26 ENCOUNTER — TREATMENT (OUTPATIENT)
Dept: PHYSICAL THERAPY | Facility: CLINIC | Age: 62
End: 2022-01-26

## 2022-01-26 DIAGNOSIS — Z96.651 S/P REVISION OF TOTAL KNEE, RIGHT: Primary | ICD-10-CM

## 2022-01-26 DIAGNOSIS — M25.661 KNEE STIFFNESS, RIGHT: ICD-10-CM

## 2022-01-26 DIAGNOSIS — R26.9 GAIT DISTURBANCE: ICD-10-CM

## 2022-01-26 PROCEDURE — 97530 THERAPEUTIC ACTIVITIES: CPT | Performed by: PHYSICAL THERAPIST

## 2022-01-26 PROCEDURE — 97140 MANUAL THERAPY 1/> REGIONS: CPT | Performed by: PHYSICAL THERAPIST

## 2022-01-26 PROCEDURE — 97110 THERAPEUTIC EXERCISES: CPT | Performed by: PHYSICAL THERAPIST

## 2022-01-28 ENCOUNTER — TREATMENT (OUTPATIENT)
Dept: PHYSICAL THERAPY | Facility: CLINIC | Age: 62
End: 2022-01-28

## 2022-01-28 DIAGNOSIS — R26.9 GAIT DISTURBANCE: ICD-10-CM

## 2022-01-28 DIAGNOSIS — Z96.651 S/P REVISION OF TOTAL KNEE, RIGHT: Primary | ICD-10-CM

## 2022-01-28 DIAGNOSIS — M25.661 KNEE STIFFNESS, RIGHT: ICD-10-CM

## 2022-01-28 PROCEDURE — 97110 THERAPEUTIC EXERCISES: CPT | Performed by: PHYSICAL THERAPIST

## 2022-01-28 PROCEDURE — 97530 THERAPEUTIC ACTIVITIES: CPT | Performed by: PHYSICAL THERAPIST

## 2022-01-28 PROCEDURE — 97140 MANUAL THERAPY 1/> REGIONS: CPT | Performed by: PHYSICAL THERAPIST

## 2022-01-28 NOTE — PROGRESS NOTES
Physical Therapy Daily Progress Note    Patient: Jaimee Quesada   : 1960  Diagnosis/ICD-10 Code:  S/P revision of total knee, right [Z96.651]  Referring practitioner: Primo Ochoa/Wilfredo Arana*  Date of Initial Visit: Type: THERAPY  Noted: 2021  Today's Date: 2022  Patient seen for 20 sessions           Subjective Evaluation    History of Present Illness    Subjective comment: Pt reporting her knee is staying sore and stiff. Pain  Current pain ratin           Objective   See Exercise, Manual, and Modality Logs for complete treatment.       Assessment & Plan     Assessment    Assessment details: Pt tolerated today's session well, pt's ROM is improving with exercise progression and manual therapy today. Flexion improving this visit. Pt would benefit from continued skilled therapy to address deficits. Progress per plan of care.                 Manual Therapy:    10     mins  28430;  Therapeutic Exercise:    12     mins  55354;     Neuromuscular Joss:    0    mins  20723;    Therapeutic Activity:     8     mins  31852;     Gait Trainin     mins  15432;     Ultrasound:     0     mins  34345;    Electrical Stimulation:    0     mins  12315 ( );  Dry Needling     0     mins self-pay;  Aquatic Therapy    0     mins  00036;  Mechanical Traction    0     mins  60533  Moist Heat     0     mins  No charge    Timed Treatment:   30   mins   Total Treatment:     30   mins    Kurt King PT  Physical Therapist    Electronically signed 2022    [place] License: PT - [id number]

## 2022-01-31 ENCOUNTER — TREATMENT (OUTPATIENT)
Dept: PHYSICAL THERAPY | Facility: CLINIC | Age: 62
End: 2022-01-31

## 2022-01-31 DIAGNOSIS — M25.661 KNEE STIFFNESS, RIGHT: ICD-10-CM

## 2022-01-31 DIAGNOSIS — R26.9 GAIT DISTURBANCE: ICD-10-CM

## 2022-01-31 DIAGNOSIS — Z96.651 S/P REVISION OF TOTAL KNEE, RIGHT: Primary | ICD-10-CM

## 2022-01-31 PROCEDURE — 97110 THERAPEUTIC EXERCISES: CPT | Performed by: PHYSICAL THERAPIST

## 2022-01-31 PROCEDURE — 97140 MANUAL THERAPY 1/> REGIONS: CPT | Performed by: PHYSICAL THERAPIST

## 2022-01-31 PROCEDURE — 97530 THERAPEUTIC ACTIVITIES: CPT | Performed by: PHYSICAL THERAPIST

## 2022-01-31 NOTE — PROGRESS NOTES
Physical Therapy Daily Progress Note    Patient: Jaimee Quesada   : 1960  Diagnosis/ICD-10 Code:  S/P revision of total knee, right [Z96.651]  Referring practitioner: Primo Ochoa/Wilfredo Arana*  Date of Initial Visit: Type: THERAPY  Noted: 2021  Today's Date: 2022  Patient seen for 21 sessions           Subjective Evaluation    History of Present Illness    Subjective comment: Pt reporting she is feeling more tight and stiff today. Pain  Current pain ratin           Objective   See Exercise, Manual, and Modality Logs for complete treatment.       Assessment & Plan     Assessment    Assessment details: Pt tolerated today's session well. Pt would benefit from continued skilled therapy to address deficits. Progress per plan of care.                 Manual Therapy:    10     mins  20442;  Therapeutic Exercise:    25     mins  82621;     Neuromuscular Joss:    0    mins  27875;    Therapeutic Activity:     8     mins  64640;     Gait Trainin     mins  50907;     Ultrasound:     0     mins  93901;    Electrical Stimulation:    0     mins  27175 ( );  Dry Needling     0     mins self-pay;  Aquatic Therapy    0     mins  26883;  Mechanical Traction    0     mins  97731  Moist Heat     0     mins  No charge    Timed Treatment:   43   mins   Total Treatment:     43   mins    Kurt King PT  Physical Therapist    Electronically signed 2022    [place] License: PT - [id number]

## 2022-02-07 ENCOUNTER — TREATMENT (OUTPATIENT)
Dept: PHYSICAL THERAPY | Facility: CLINIC | Age: 62
End: 2022-02-07

## 2022-02-07 DIAGNOSIS — M25.661 KNEE STIFFNESS, RIGHT: ICD-10-CM

## 2022-02-07 DIAGNOSIS — Z96.651 S/P REVISION OF TOTAL KNEE, RIGHT: Primary | ICD-10-CM

## 2022-02-07 DIAGNOSIS — R26.9 GAIT DISTURBANCE: ICD-10-CM

## 2022-02-07 PROCEDURE — 97110 THERAPEUTIC EXERCISES: CPT | Performed by: PHYSICAL THERAPIST

## 2022-02-07 PROCEDURE — 97530 THERAPEUTIC ACTIVITIES: CPT | Performed by: PHYSICAL THERAPIST

## 2022-02-07 PROCEDURE — 97140 MANUAL THERAPY 1/> REGIONS: CPT | Performed by: PHYSICAL THERAPIST

## 2022-02-07 NOTE — PROGRESS NOTES
Recertification / Reevaluation        Patient: Jaimee Quesada   : 1960  Diagnosis/ICD-10 Code:  S/P revision of total knee, right [Z96.651]  Referring practitioner: Primo Ochoa/Wilfredo Arana*  Date of Initial Visit: Type: THERAPY  Noted: 2021  Today's Date: 2022  Patient seen for 22 sessions      Subjective:     Subjective Questionnaire: LEFS: 33/80  Clinical Progress: improved  Home Program Compliance: Yes  Treatment has included: therapeutic exercise, manual therapy and therapeutic activity    Subjective Evaluation    History of Present Illness  Mechanism of injury: Pt reporting she is still having a lot of stiffness and pain over the inside of the knee, she has good days and bad days still. But the weather seem does seem to be keeping her stiff.    Pain  Current pain ratin         Objective     Active Range of Motion      Right Knee   Flexion: 95 degrees  Extension: Right knee active extension: -10.      Passive Range of Motion      Right Knee   Flexion: 115 degrees   Extension: Right knee passive extension: -4.      Strength/Myotome Testing      Left Knee   Flexion: 5  Extension: 5     Right Knee   Flexion: 4+  Extension: 4+     Ambulation      Comments   mildly antalgic on RLE with decreased flexion and extension throughout gait      Assessment & Plan     Assessment  Impairments: abnormal gait, abnormal muscle firing, abnormal or restricted ROM, activity intolerance, impaired balance, impaired physical strength, pain with function and weight-bearing intolerance  Functional Limitations: walking, standing and stooping  Assessment details: Patient has improved with strength at this point, her ROM has fluctuated throughout her treatment, at times doing much better and other times is more stiff. Swelling is still an issue, passive motion has improved slightly with flexion and extension, active has been more difficult to maintain.  The patient presents with associated knee weakness,  stiffness, antalgic gait, and functional deficits (LEFS). The patient would benefit from skilled PT intervention to address the above mentioned functional limitations.     Prognosis: good    Goals  Plan Goals: KNEE PROBLEMS    1. The patient has limited ROM of the right knee.   LTG 1:12 weeks:  The patient will demonstrate 5 to 110 degrees of ROM for the right knee in order to allow patient to ambulate.    STATUS:  MET for passive, not active   STG 1a: 6 weeks:  The patient will demonstrate 8 to 100 degrees of ROM for the right knee.    STATUS:  MET for passive, not active   TREATMENT: Manual therapy, therapeutic exercise, home exercise instruction, and modalities as needed to include:  moist heat, electrical stimulation, ultrasound, and ice.    2. The patient has limited strength of the right knee.   LTG 2: 12 weeks: The patient will demonstrate 4+ /5 strength for knee flexion and extension in order to allow patient improved joint stability    STATUS: MET   STG 2a: 6 weeks: The patient will demonstrate 4 /5 strength for knee flexion and extension    STATUS:  MET   TREATMENT: Manual therapy, therapeutic exercise, home exercise instruction, aquatic therapy, and modalities as needed to include:  moist heat, electrical stimulation, ultrasound, and ice.     3. The patient has gait dysfunction.   LTG 3: 12 weeks:  The patient will ambulate without assistive device, independently, for community distances with normal biomechanics of the right lower extremity in order to improve mobility and allow patient to perform activities such as grocery shopping with greater ease.    STATUS:  Not met   STG 3a: 4 weeks: The patient will ambulate with a single tipped cane with appropriate gait mechanics.    STATUS:  MET   TREATMENT: Gait training, aquatic therapy, therapeutic exercise, and home exercise instruction.    4. Mobility: Walking/Moving Around Functional Limitation     LTG 4: 12 weeks:  The patient will demonstrate 25 %  limitation by achieving a score of 60/80 on the LEFS.    STATUS:  Not met   STG 4 a: 6 weeks:  The patient will demonstrate 50 % limitation by achieving a score of 40/80 on the LEFS.      STATUS:  Not met   TREATMENT:  Manual therapy, therapeutic exercise, home exercise instruction, and modalities as needed to include: moist heat, electrical stimulation, and ultrasound.       Plan  Therapy options: will be seen for skilled therapy services  Planned modality interventions: TENS, cryotherapy and thermotherapy (hydrocollator packs)  Planned therapy interventions: functional ROM exercises, gait training, home exercise program, manual therapy, strengthening, stretching, therapeutic activities, soft tissue mobilization, joint mobilization, neuromuscular re-education, balance/weight-bearing training and transfer training  Other planned therapy interventions: aquatic therapy  Frequency: 3x week  Duration in weeks: 12  Treatment plan discussed with: patient      Progress toward previous goals: Partially Met    See Exercise, Manual, and Modality Logs for complete treatment.         Recommendations: Continue as planned  Timeframe: 2 months  Prognosis to achieve goals: good    PT Signature: Kurt King PT    Electronically signed 2022    [place] License: PT - [id number]     Based upon review of the patient's progress and continued therapy plan, it is my medical opinion that Jaimee Quesada should continue physical therapy treatment at [facility]  [address] [place] [address] [phone].      Timed:         Manual Therapy:    12     mins  78133;     Therapeutic Exercise:    15     mins  77639;     Neuromuscular Joss:    0    mins  94655;    Therapeutic Activity:     10     mins  93848;     Gait Trainin     mins  53485;     Ultrasound:     0     mins  11714;    Ionto                               0    mins   90124  Self Care                       0     mins    09836  Aquatic                          0     mins 50682      Un-Timed:  Electrical Stimulation:    0     mins  43104 ( );  Dry Needling     0     mins self-pay  Traction     0     mins 40013  Low Eval     0     Mins  31092  Mod Eval     0     Mins  61452  High Eval                       0     Mins  27811  Re-Eval                           0    mins  56495      Timed Treatment:   37   mins   Total Treatment:     37   mins    Recertification                Certification Period: 2/7/2022 through 5/8/2022  I certify that the therapy services are furnished while this patient is under my care.  The services outlined above are required by this patient, and will be reviewed every 90 days.                PHYSICIAN: Primo Stephen/MD Wilfredo                                              DATE:      Please sign and return via fax to [phone]. Thank you, [facility].

## 2022-02-17 ENCOUNTER — TREATMENT (OUTPATIENT)
Dept: PHYSICAL THERAPY | Facility: CLINIC | Age: 62
End: 2022-02-17

## 2022-02-17 DIAGNOSIS — Z96.651 S/P REVISION OF TOTAL KNEE, RIGHT: Primary | ICD-10-CM

## 2022-02-17 DIAGNOSIS — M25.661 KNEE STIFFNESS, RIGHT: ICD-10-CM

## 2022-02-17 DIAGNOSIS — R26.9 GAIT DISTURBANCE: ICD-10-CM

## 2022-02-17 PROCEDURE — 97530 THERAPEUTIC ACTIVITIES: CPT | Performed by: PHYSICAL THERAPIST

## 2022-02-17 PROCEDURE — 97140 MANUAL THERAPY 1/> REGIONS: CPT | Performed by: PHYSICAL THERAPIST

## 2022-02-17 PROCEDURE — 97110 THERAPEUTIC EXERCISES: CPT | Performed by: PHYSICAL THERAPIST

## 2022-02-17 NOTE — PROGRESS NOTES
Physical Therapy Daily Progress Note    Patient: Jaimee Quesada   : 1960  Diagnosis/ICD-10 Code:  S/P revision of total knee, right [Z96.651]  Referring practitioner: Primo Ochoa/Wilfredo Arana*  Date of Initial Visit: Type: THERAPY  Noted: 2021  Today's Date: 2022  Patient seen for 23 sessions           Subjective Evaluation    History of Present Illness    Subjective comment: Pt reporting she is still very stiff, she went to her physician yesterday and they are wanting to do more surgery, but she is not wanting anymore.Pain  Current pain ratin           Objective   See Exercise, Manual, and Modality Logs for complete treatment.       Assessment & Plan     Assessment    Assessment details: Pt tolerated today's session fair, still very stiff. Pt would benefit from continued skilled therapy to address deficits. Progress per plan of care.                 Manual Therapy:    12     mins  77081;  Therapeutic Exercise:    10     mins  68631;     Neuromuscular Joss:    0    mins  18253;    Therapeutic Activity:     15     mins  18801;     Gait Trainin     mins  84551;     Ultrasound:     0     mins  10914;    Electrical Stimulation:    0     mins  70241 ( );  Dry Needling     0     mins self-pay;  Aquatic Therapy    0     mins  77557;  Mechanical Traction    0     mins  18507  Moist Heat     0     mins  No charge    Timed Treatment:   37   mins   Total Treatment:     37   mins    Kurt King PT  Physical Therapist    Electronically signed 2022    [place] License: PT - [id number]

## 2022-02-18 ENCOUNTER — TREATMENT (OUTPATIENT)
Dept: PHYSICAL THERAPY | Facility: CLINIC | Age: 62
End: 2022-02-18

## 2022-02-18 DIAGNOSIS — M25.661 KNEE STIFFNESS, RIGHT: ICD-10-CM

## 2022-02-18 DIAGNOSIS — Z96.651 S/P REVISION OF TOTAL KNEE, RIGHT: Primary | ICD-10-CM

## 2022-02-18 DIAGNOSIS — R26.9 GAIT DISTURBANCE: ICD-10-CM

## 2022-02-18 PROCEDURE — 97140 MANUAL THERAPY 1/> REGIONS: CPT | Performed by: PHYSICAL THERAPIST

## 2022-02-18 PROCEDURE — 97110 THERAPEUTIC EXERCISES: CPT | Performed by: PHYSICAL THERAPIST

## 2022-02-18 PROCEDURE — 97530 THERAPEUTIC ACTIVITIES: CPT | Performed by: PHYSICAL THERAPIST

## 2022-02-18 NOTE — PROGRESS NOTES
Physical Therapy Daily Progress Note    Patient: Jaimee Quesada   : 1960  Diagnosis/ICD-10 Code:  S/P revision of total knee, right [Z96.651]  Referring practitioner: Primo cOhoa/Wilfredo Arana*  Date of Initial Visit: Type: THERAPY  Noted: 2021  Today's Date: 2022  Patient seen for 24 sessions           Subjective Evaluation    History of Present Illness    Subjective comment: Pt reporting she feels like she is in a better mind set today, the knee is feeling better after yesterdays workout.Pain  Current pain ratin           Objective   See Exercise, Manual, and Modality Logs for complete treatment.       Assessment & Plan     Assessment    Assessment details: Pt tolerated today's session well, continues to demonstrate improved ROM with stretching exercises and manual therapy. Reviewed her Ermi extension device today, pt demonstrates good knowledge to use and proper safety precautions. Pt would benefit from continued skilled therapy to address deficits. Progress per plan of care.                 Manual Therapy:    10     mins  33674;  Therapeutic Exercise:    8     mins  50600;     Neuromuscular Joss:    0    mins  87161;    Therapeutic Activity:     10     mins  62891;     Gait Trainin     mins  67425;     Ultrasound:     0     mins  20302;    Electrical Stimulation:    0     mins  19371 ( );  Dry Needling     0     mins self-pay;  Aquatic Therapy    0     mins  86121;  Mechanical Traction    0     mins  65191  Moist Heat     0     mins  No charge    Timed Treatment:   28   mins   Total Treatment:     28   mins    Kurt King PT  Physical Therapist    Electronically signed 2022    [place] License: PT - [id number]

## 2022-02-21 ENCOUNTER — TREATMENT (OUTPATIENT)
Dept: PHYSICAL THERAPY | Facility: CLINIC | Age: 62
End: 2022-02-21

## 2022-02-21 DIAGNOSIS — Z96.651 S/P REVISION OF TOTAL KNEE, RIGHT: Primary | ICD-10-CM

## 2022-02-21 DIAGNOSIS — R26.9 GAIT DISTURBANCE: ICD-10-CM

## 2022-02-21 DIAGNOSIS — M25.661 KNEE STIFFNESS, RIGHT: ICD-10-CM

## 2022-02-21 PROCEDURE — 97140 MANUAL THERAPY 1/> REGIONS: CPT | Performed by: PHYSICAL THERAPIST

## 2022-02-21 PROCEDURE — 97110 THERAPEUTIC EXERCISES: CPT | Performed by: PHYSICAL THERAPIST

## 2022-02-21 PROCEDURE — 97530 THERAPEUTIC ACTIVITIES: CPT | Performed by: PHYSICAL THERAPIST

## 2022-02-21 NOTE — PROGRESS NOTES
Physical Therapy Daily Progress Note    Patient: Jaimee Quesada   : 1960  Diagnosis/ICD-10 Code:  S/P revision of total knee, right [Z96.651]  Referring practitioner: Primo Ochoa/Wilfredo Arana*  Date of Initial Visit: Type: THERAPY  Noted: 2021  Today's Date: 2022  Patient seen for 25 sessions           Subjective Evaluation    History of Present Illness    Subjective comment: Pt reporting she felt pretty good over the weekend, felt like the knee is staying more loose even being as tight as it is.Pain  Current pain rating: 3           Objective   See Exercise, Manual, and Modality Logs for complete treatment.       Assessment & Plan     Assessment    Assessment details: Pt tolerated today's session well, progressed functional exercises along with ongoing emphasis on improving ROM. Pt would benefit from continued skilled therapy to address deficits. Progress per plan of care.                 Manual Therapy:    10     mins  38338;  Therapeutic Exercise:    12     mins  32209;     Neuromuscular Joss:    0    mins  63495;    Therapeutic Activity:     10     mins  16891;     Gait Trainin     mins  52338;     Ultrasound:     0     mins  58389;    Electrical Stimulation:    0     mins  16543 ( );  Dry Needling     0     mins self-pay;  Aquatic Therapy    0     mins  48937;  Mechanical Traction    0     mins  52905  Moist Heat     0     mins  No charge    Timed Treatment:   32   mins   Total Treatment:     32   mins    Kurt King PT  Physical Therapist    Electronically signed 2022    [place] License: PT - [id number]

## 2022-02-24 ENCOUNTER — TREATMENT (OUTPATIENT)
Dept: PHYSICAL THERAPY | Facility: CLINIC | Age: 62
End: 2022-02-24

## 2022-02-24 DIAGNOSIS — Z96.651 S/P REVISION OF TOTAL KNEE, RIGHT: Primary | ICD-10-CM

## 2022-02-24 DIAGNOSIS — M25.661 KNEE STIFFNESS, RIGHT: ICD-10-CM

## 2022-02-24 DIAGNOSIS — R26.9 GAIT DISTURBANCE: ICD-10-CM

## 2022-02-24 PROCEDURE — 97140 MANUAL THERAPY 1/> REGIONS: CPT | Performed by: PHYSICAL THERAPIST

## 2022-02-24 PROCEDURE — 97530 THERAPEUTIC ACTIVITIES: CPT | Performed by: PHYSICAL THERAPIST

## 2022-02-24 PROCEDURE — 97112 NEUROMUSCULAR REEDUCATION: CPT | Performed by: PHYSICAL THERAPIST

## 2022-02-24 PROCEDURE — 97110 THERAPEUTIC EXERCISES: CPT | Performed by: PHYSICAL THERAPIST

## 2022-02-24 NOTE — PROGRESS NOTES
Physical Therapy Daily Progress Note    Patient: Jaimee Quesada   : 1960  Diagnosis/ICD-10 Code:  S/P revision of total knee, right [Z96.651]  Referring practitioner: Primo Ochoa/Wilfredo Arana*  Date of Initial Visit: Type: THERAPY  Noted: 2021  Today's Date: 2022  Patient seen for 26 sessions           Subjective Evaluation    History of Present Illness    Subjective comment: Pt reporting she is very stiff today, she had to stand at the [facility] for a long time and its swollen.Pain  Current pain ratin           Objective   See Exercise, Manual, and Modality Logs for complete treatment.       Assessment & Plan     Assessment    Assessment details: Pt tolerated today's session well, continued with functional progression, ROM, and strengthening. Pt would benefit from continued skilled therapy to address deficits. Progress per plan of care.                 Manual Therapy:    15     mins  68598;  Therapeutic Exercise:    24     mins  71207;     Neuromuscular Joss:    8    mins  72379;    Therapeutic Activity:     12     mins  20017;     Gait Trainin     mins  04637;     Ultrasound:     0     mins  02220;    Electrical Stimulation:    0     mins  12568 ( );  Dry Needling     0     mins self-pay;  Aquatic Therapy    0     mins  45295;  Mechanical Traction    0     mins  72000  Moist Heat     0     mins  No charge    Timed Treatment:   59   mins   Total Treatment:     59   mins    Kurt King PT  Physical Therapist    Electronically signed 2022    [place] License: PT - [id number]

## 2022-02-28 ENCOUNTER — TREATMENT (OUTPATIENT)
Dept: PHYSICAL THERAPY | Facility: CLINIC | Age: 62
End: 2022-02-28

## 2022-02-28 DIAGNOSIS — Z96.651 S/P REVISION OF TOTAL KNEE, RIGHT: Primary | ICD-10-CM

## 2022-02-28 DIAGNOSIS — R26.9 GAIT DISTURBANCE: ICD-10-CM

## 2022-02-28 DIAGNOSIS — M25.661 KNEE STIFFNESS, RIGHT: ICD-10-CM

## 2022-02-28 PROCEDURE — 97140 MANUAL THERAPY 1/> REGIONS: CPT | Performed by: PHYSICAL THERAPIST

## 2022-02-28 PROCEDURE — 97110 THERAPEUTIC EXERCISES: CPT | Performed by: PHYSICAL THERAPIST

## 2022-02-28 PROCEDURE — 97530 THERAPEUTIC ACTIVITIES: CPT | Performed by: PHYSICAL THERAPIST

## 2022-02-28 NOTE — PROGRESS NOTES
Physical Therapy Daily Progress Note    Patient: Jaimee Quesada   : 1960  Diagnosis/ICD-10 Code:  S/P revision of total knee, right [Z96.651]  Referring practitioner: Primo Ochoa/Wilfredo Arana*  Date of Initial Visit: Type: THERAPY  Noted: 2021  Today's Date: 2022  Patient seen for 27 sessions           Subjective Evaluation    History of Present Illness    Subjective comment: Pt reporting having a lot more pain today, she has been running errands all weekend, so she has been on it a lot and feeling very sore and stiff.Pain  Current pain ratin           Objective   See Exercise, Manual, and Modality Logs for complete treatment.       Assessment & Plan     Assessment    Assessment details: Pt tolerated today's session fair, increased antalgia with ambulation today. She sits with the knee more extended than normal in a guarded position. Her knee ROM did improve with therapy today, but still very stiff. Pt would benefit from continued skilled therapy to address deficits. Progress per plan of care.                 Manual Therapy:    12     mins  91320;  Therapeutic Exercise:    15     mins  66737;     Neuromuscular Joss:    0    mins  41913;    Therapeutic Activity:     10     mins  17756;     Gait Trainin     mins  42170;     Ultrasound:     0     mins  66145;    Electrical Stimulation:    0     mins  74933 ( );  Dry Needling     0     mins self-pay;  Aquatic Therapy    0     mins  58048;  Mechanical Traction    0     mins  13051  Moist Heat     0     mins  No charge    Timed Treatment:   37   mins   Total Treatment:     37   mins    Kurt King PT  Physical Therapist    Electronically signed 2022    [place] License: PT - [id number]

## 2022-03-03 ENCOUNTER — TREATMENT (OUTPATIENT)
Dept: PHYSICAL THERAPY | Facility: CLINIC | Age: 62
End: 2022-03-03

## 2022-03-03 DIAGNOSIS — Z96.651 S/P REVISION OF TOTAL KNEE, RIGHT: Primary | ICD-10-CM

## 2022-03-03 DIAGNOSIS — R26.9 GAIT DISTURBANCE: ICD-10-CM

## 2022-03-03 DIAGNOSIS — M25.661 KNEE STIFFNESS, RIGHT: ICD-10-CM

## 2022-03-03 PROCEDURE — 97110 THERAPEUTIC EXERCISES: CPT | Performed by: PHYSICAL THERAPIST

## 2022-03-03 PROCEDURE — 97530 THERAPEUTIC ACTIVITIES: CPT | Performed by: PHYSICAL THERAPIST

## 2022-03-03 PROCEDURE — 97140 MANUAL THERAPY 1/> REGIONS: CPT | Performed by: PHYSICAL THERAPIST

## 2022-03-03 NOTE — PROGRESS NOTES
Physical Therapy Daily Progress Note    Patient: Jaimee Quesada   : 1960  Diagnosis/ICD-10 Code:  S/P revision of total knee, right [Z96.651]  Referring practitioner: Primo Ochoa/Wilfredo Arana*  Date of Initial Visit: Type: THERAPY  Noted: 2021  Today's Date: 3/3/2022  Patient seen for 28 sessions           Subjective Evaluation    History of Present Illness    Subjective comment: Pt reporting she is still very stiff today, she doesn't know why she has been so sore and stiff the last several weeks.Pain  Current pain ratin           Objective   See Exercise, Manual, and Modality Logs for complete treatment.       Assessment & Plan     Assessment    Assessment details: Pt tolerated today's session fair, flexion much more limited compared to previous visits, did improve with manual therapy and exercises. Pt would benefit from continued skilled therapy to address deficits. Progress per plan of care.                 Manual Therapy:    10     mins  45321;  Therapeutic Exercise:    12     mins  54926;     Neuromuscular Joss:    0    mins  40048;    Therapeutic Activity:     8     mins  71010;     Gait Trainin     mins  95617;     Ultrasound:     0     mins  54507;    Electrical Stimulation:    0     mins  62721 ( );  Dry Needling     0     mins self-pay;  Aquatic Therapy    0     mins  32897;  Mechanical Traction    0     mins  90132  Moist Heat     0     mins  No charge    Timed Treatment:   30   mins   Total Treatment:     30   mins    Kurt King PT  Physical Therapist    Electronically signed 3/3/2022    [place] License: PT - [id number]   
68

## 2022-03-07 ENCOUNTER — TREATMENT (OUTPATIENT)
Dept: PHYSICAL THERAPY | Facility: CLINIC | Age: 62
End: 2022-03-07

## 2022-03-07 DIAGNOSIS — R26.9 GAIT DISTURBANCE: ICD-10-CM

## 2022-03-07 DIAGNOSIS — Z96.651 S/P REVISION OF TOTAL KNEE, RIGHT: Primary | ICD-10-CM

## 2022-03-07 DIAGNOSIS — M25.661 KNEE STIFFNESS, RIGHT: ICD-10-CM

## 2022-03-07 PROCEDURE — 97530 THERAPEUTIC ACTIVITIES: CPT | Performed by: PHYSICAL THERAPIST

## 2022-03-07 PROCEDURE — 97140 MANUAL THERAPY 1/> REGIONS: CPT | Performed by: PHYSICAL THERAPIST

## 2022-03-07 PROCEDURE — 97110 THERAPEUTIC EXERCISES: CPT | Performed by: PHYSICAL THERAPIST

## 2022-03-07 NOTE — PROGRESS NOTES
Progress Assessment        Patient: Jaimee Quesada   : 1960  Diagnosis/ICD-10 Code:  S/P revision of total knee, right [Z96.651]  Referring practitioner: Primo Ochoa/Wilfredo Arana*  Date of Initial Visit: Type: THERAPY  Noted: 2021  Today's Date: 3/7/2022  Patient seen for 29 sessions      Subjective:     Subjective Questionnaire: LEFS:   Clinical Progress: improved  Home Program Compliance: Yes  Treatment has included: therapeutic exercise, neuromuscular re-education, manual therapy, therapeutic activity and gait training    Subjective Evaluation    History of Present Illness  Mechanism of injury: Pt reporting she is continuing to have pain and tightness in her knee, it has been worse the last couple weeks, but unsure why it has been sore sore. She is using her cane all the time for walking now.     Pain  Current pain ratin         Objective   Active Range of Motion      Right Knee   Flexion: 100 degrees  Extension: Right knee active extension: -6.      Passive Range of Motion      Right Knee   Flexion: 107 degrees   Extension: Right knee passive extension: -2.      Strength/Myotome Testing      Left Knee   Flexion: 5  Extension: 5     Right Knee   Flexion: 4+  Extension: 4+     Ambulation      Comments   mildly antalgic on RLE with decreased flexion and extension throughout gait, using SPC      Assessment & Plan     Assessment  Impairments: abnormal gait, abnormal muscle firing, abnormal or restricted ROM, activity intolerance, impaired balance, impaired physical strength, pain with function and weight-bearing intolerance  Functional Limitations: walking, standing and stooping  Assessment details: Pt has been noticeably more stiff the last several weeks since the last reassessment, unsure of why. Swelling is still an issue, but she has been walking slower with her cane and limited knee flexion and extension during gait. Even still, she has now met her short term goal for active ROM. The  patient presents with associated knee weakness, stiffness, antalgic gait, and functional deficits (LEFS). The patient would benefit from skilled PT intervention to address the above mentioned functional limitations.     Prognosis: good    Goals  Plan Goals: KNEE PROBLEMS    1. The patient has limited ROM of the right knee.   LTG 1:12 weeks:  The patient will demonstrate 5 to 110 degrees of ROM for the right knee in order to allow patient to ambulate.    STATUS:  MET for passive, not active   STG 1a: 6 weeks:  The patient will demonstrate 8 to 100 degrees of ROM for the right knee.    STATUS:  MET   TREATMENT: Manual therapy, therapeutic exercise, home exercise instruction, and modalities as needed to include:  moist heat, electrical stimulation, ultrasound, and ice.    2. The patient has limited strength of the right knee.   LTG 2: 12 weeks: The patient will demonstrate 4+ /5 strength for knee flexion and extension in order to allow patient improved joint stability    STATUS: MET   STG 2a: 6 weeks: The patient will demonstrate 4 /5 strength for knee flexion and extension    STATUS:  MET   TREATMENT: Manual therapy, therapeutic exercise, home exercise instruction, aquatic therapy, and modalities as needed to include:  moist heat, electrical stimulation, ultrasound, and ice.     3. The patient has gait dysfunction.   LTG 3: 12 weeks:  The patient will ambulate without assistive device, independently, for community distances with normal biomechanics of the right lower extremity in order to improve mobility and allow patient to perform activities such as grocery shopping with greater ease.    STATUS:  Not met   STG 3a: 4 weeks: The patient will ambulate with a single tipped cane with appropriate gait mechanics.    STATUS:  MET   TREATMENT: Gait training, aquatic therapy, therapeutic exercise, and home exercise instruction.    4. Mobility: Walking/Moving Around Functional Limitation     LTG 4: 12 weeks:  The patient will  demonstrate 25 % limitation by achieving a score of 60/80 on the LEFS.    STATUS:  Not met   STG 4 a: 6 weeks:  The patient will demonstrate 50 % limitation by achieving a score of 40/80 on the LEFS.      STATUS:  Not met   TREATMENT:  Manual therapy, therapeutic exercise, home exercise instruction, and modalities as needed to include: moist heat, electrical stimulation, and ultrasound.       Plan  Therapy options: will be seen for skilled therapy services  Planned modality interventions: TENS, cryotherapy and thermotherapy (hydrocollator packs)  Planned therapy interventions: functional ROM exercises, gait training, home exercise program, manual therapy, strengthening, stretching, therapeutic activities, soft tissue mobilization, joint mobilization, neuromuscular re-education, balance/weight-bearing training and transfer training  Other planned therapy interventions: aquatic therapy  Frequency: 3x week  Duration in weeks: 12  Treatment plan discussed with: patient      Progress toward previous goals: Partially Met    See Exercise, Manual, and Modality Logs for complete treatment.         Recommendations: Continue as planned  Timeframe: 2 months  Prognosis to achieve goals: good    PT Signature: Kurt King PT    Electronically signed 3/7/2022    [place] License: PT - [id number]     Based upon review of the patient's progress and continued therapy plan, it is my medical opinion that Jaimee Quesada should continue physical therapy treatment at [facility]  [address] [place] [address] [phone].      Timed:         Manual Therapy:    14     mins  54258;     Therapeutic Exercise:    10     mins  11453;     Neuromuscular Joss:    0    mins  33366;    Therapeutic Activity:     12     mins  08610;     Gait Trainin     mins  22228;     Ultrasound:     0     mins  89736;    Ionto                               0    mins   46890  Self Care                       0      mins   56414  Aquatic                          0     mins 17817            Timed Treatment:   36   mins   Total Treatment:     36   mins      I certify that the therapy services are furnished while this patient is under my care.  The services outlined above are required by this patient, and will be reviewed every 90 days.

## 2022-03-10 ENCOUNTER — TREATMENT (OUTPATIENT)
Dept: PHYSICAL THERAPY | Facility: CLINIC | Age: 62
End: 2022-03-10

## 2022-03-10 DIAGNOSIS — Z96.651 S/P REVISION OF TOTAL KNEE, RIGHT: Primary | ICD-10-CM

## 2022-03-10 DIAGNOSIS — R26.9 GAIT DISTURBANCE: ICD-10-CM

## 2022-03-10 DIAGNOSIS — M25.661 KNEE STIFFNESS, RIGHT: ICD-10-CM

## 2022-03-10 PROCEDURE — 97110 THERAPEUTIC EXERCISES: CPT | Performed by: PHYSICAL THERAPIST

## 2022-03-10 PROCEDURE — 97112 NEUROMUSCULAR REEDUCATION: CPT | Performed by: PHYSICAL THERAPIST

## 2022-03-10 PROCEDURE — 97530 THERAPEUTIC ACTIVITIES: CPT | Performed by: PHYSICAL THERAPIST

## 2022-03-10 NOTE — PROGRESS NOTES
Physical Therapy Daily Progress Note    Patient: Jaimee Quesada   : 1960  Diagnosis/ICD-10 Code:  S/P revision of total knee, right [Z96.651]  Referring practitioner: Primo Ochoa/Wilfredo Arana*  Date of Initial Visit: Type: THERAPY  Noted: 2021  Today's Date: 3/10/2022  Patient seen for 30 sessions           Subjective Evaluation    History of Present Illness    Subjective comment: Pt reporting she is very sore today, in her knee and even down into the inside of her calf. Pain  Current pain ratin           Objective   See Exercise, Manual, and Modality Logs for complete treatment.       Assessment & Plan     Assessment    Assessment details: Pt tolerated today's session well overall, focused on ROM activities and gentle strengthening and functional movement. Pt would benefit from continued skilled therapy to address deficits. Progress per plan of care.                 Manual Therapy:    0     mins  32768;  Therapeutic Exercise:    10     mins  01103;     Neuromuscular Joss:    8    mins  45498;    Therapeutic Activity:     12     mins  05852;     Gait Trainin     mins  09280;     Ultrasound:     0     mins  01362;    Electrical Stimulation:    0     mins  53749 ( );  Dry Needling     0     mins self-pay;  Aquatic Therapy    0     mins  76977;  Mechanical Traction    0     mins  14377  Moist Heat     0     mins  No charge    Timed Treatment:   30   mins   Total Treatment:     30   mins    Kurt King PT  Physical Therapist    Electronically signed 3/10/2022    [place] License: PT - [id number]

## 2022-03-16 ENCOUNTER — TRANSCRIBE ORDERS (OUTPATIENT)
Dept: PHYSICAL THERAPY | Facility: CLINIC | Age: 62
End: 2022-03-16

## 2022-03-17 ENCOUNTER — TREATMENT (OUTPATIENT)
Dept: PHYSICAL THERAPY | Facility: CLINIC | Age: 62
End: 2022-03-17

## 2022-03-17 DIAGNOSIS — Z96.651 S/P REVISION OF TOTAL KNEE, RIGHT: Primary | ICD-10-CM

## 2022-03-17 DIAGNOSIS — R26.9 GAIT DISTURBANCE: ICD-10-CM

## 2022-03-17 DIAGNOSIS — M25.661 KNEE STIFFNESS, RIGHT: ICD-10-CM

## 2022-03-17 PROCEDURE — 97110 THERAPEUTIC EXERCISES: CPT | Performed by: PHYSICAL THERAPIST

## 2022-03-17 PROCEDURE — 97530 THERAPEUTIC ACTIVITIES: CPT | Performed by: PHYSICAL THERAPIST

## 2022-03-17 NOTE — PROGRESS NOTES
Physical Therapy Daily Progress Note    Patient: Jaimee Quesada   : 1960  Diagnosis/ICD-10 Code:  S/P revision of total knee, right [Z96.651]  Referring practitioner: Primo Ochoa/Wilfredo Arana*  Date of Initial Visit: Type: THERAPY  Noted: 2021  Today's Date: 3/17/2022  Patient seen for 31 sessions           Subjective Evaluation    History of Present Illness  Mechanism of injury: Pt reporting she returned to the physician and was given a script for more therapy. She relates being so tired of dealing with her knee. She is not feeling good about her progress or with how her case is being handled. She feels like she is just done with therapy, but the doctor ordered more, so she will finish it out then see where she is afterwards.           Objective   See Exercise, Manual, and Modality Logs for complete treatment.       Assessment & Plan     Assessment    Assessment details: Pt tolerated today's session well, she is still having pain and tightness with her exercises and stretches. Pt would benefit from continued skilled therapy to address deficits. Progress per plan of care.                 Manual Therapy:    0     mins  94891;  Therapeutic Exercise:    24     mins  05050;     Neuromuscular Joss:    0    mins  33503;    Therapeutic Activity:     10     mins  09119;     Gait Trainin     mins  69517;     Ultrasound:     0     mins  91432;    Electrical Stimulation:    0     mins  31966 ( );  Dry Needling     0     mins self-pay;  Aquatic Therapy    0     mins  46677;  Mechanical Traction    0     mins  29901  Moist Heat     0     mins  No charge    Timed Treatment:   34   mins   Total Treatment:     34   mins    Kurt King PT  Physical Therapist    Electronically signed 3/17/2022    [place] License: PT - [id number]

## 2022-03-21 ENCOUNTER — TREATMENT (OUTPATIENT)
Dept: PHYSICAL THERAPY | Facility: CLINIC | Age: 62
End: 2022-03-21

## 2022-03-21 DIAGNOSIS — Z96.651 S/P REVISION OF TOTAL KNEE, RIGHT: Primary | ICD-10-CM

## 2022-03-21 DIAGNOSIS — M25.661 KNEE STIFFNESS, RIGHT: ICD-10-CM

## 2022-03-21 DIAGNOSIS — R26.9 GAIT DISTURBANCE: ICD-10-CM

## 2022-03-21 PROCEDURE — 97116 GAIT TRAINING THERAPY: CPT | Performed by: PHYSICAL THERAPIST

## 2022-03-21 PROCEDURE — 97110 THERAPEUTIC EXERCISES: CPT | Performed by: PHYSICAL THERAPIST

## 2022-03-21 PROCEDURE — 97530 THERAPEUTIC ACTIVITIES: CPT | Performed by: PHYSICAL THERAPIST

## 2022-03-21 NOTE — PROGRESS NOTES
Physical Therapy Daily Progress Note    Patient: Jaimee Quesada   : 1960  Diagnosis/ICD-10 Code:  S/P revision of total knee, right [Z96.651]  Referring practitioner: Primo Ochoa/Wilfredo Arana*  Date of Initial Visit: Type: THERAPY  Noted: 2021  Today's Date: 3/21/2022  Patient seen for 32 sessions           Subjective Evaluation    History of Present Illness    Subjective comment: Pt reporting she is still very sore and stiff, although states she didn't do much besides laundry over the weekend and is less swollen.Pain  Current pain ratin           Objective   See Exercise, Manual, and Modality Logs for complete treatment.       Assessment & Plan     Assessment    Assessment details: Pt tolerated today's session well, she demonstrates improved gait mechanics with practice using the foam blocks and is tolerating functional progression of mini squats well. Plan to continue functional activity progression to improve endurance and functional strength before discharge. Pt would benefit from continued skilled therapy to address deficits. Progress per plan of care.                 Manual Therapy:    0     mins  05448;  Therapeutic Exercise:    15     mins  05565;     Neuromuscular Joss:    0    mins  89538;    Therapeutic Activity:     10     mins  57561;     Gait Trainin     mins  31255;     Ultrasound:     0     mins  22011;    Electrical Stimulation:    0     mins  53633 ( );  Dry Needling     0     mins self-pay;  Aquatic Therapy    0     mins  68492;  Mechanical Traction    0     mins  04027  Moist Heat     0     mins  No charge    Timed Treatment:   33   mins   Total Treatment:     33   mins    Kurt King PT  Physical Therapist    Electronically signed 3/21/2022    [place] License: PT - [id number]

## 2022-03-25 ENCOUNTER — DOCUMENTATION (OUTPATIENT)
Dept: PHYSICAL THERAPY | Facility: CLINIC | Age: 62
End: 2022-03-25

## 2022-03-25 NOTE — PROGRESS NOTES
Discharge Summary  Discharge Summary from Physical Therapy Report          Goals: Partially Met    Discharge Plan: Continue with current home exercise program as instructed    Comments Pt was denied additional PT visits.     Date of Discharge 3/25/22      KNEE PROBLEMS    1. The patient has limited ROM of the right knee.              LTG 1:12 weeks:  The patient will demonstrate 5 to 110 degrees of ROM for the right knee in order to allow patient to ambulate.                          STATUS:  MET for passive, not active              STG 1a: 6 weeks:  The patient will demonstrate 8 to 100 degrees of ROM for the right knee.                          STATUS:  MET              TREATMENT: Manual therapy, therapeutic exercise, home exercise instruction, and modalities as needed to include:  moist heat, electrical stimulation, ultrasound, and ice.    2. The patient has limited strength of the right knee.              LTG 2: 12 weeks: The patient will demonstrate 4+ /5 strength for knee flexion and extension in order to allow patient improved joint stability                          STATUS: MET              STG 2a: 6 weeks: The patient will demonstrate 4 /5 strength for knee flexion and extension                          STATUS:  MET              TREATMENT: Manual therapy, therapeutic exercise, home exercise instruction, aquatic therapy, and modalities as needed to include:  moist heat, electrical stimulation, ultrasound, and ice.                3. The patient has gait dysfunction.              LTG 3: 12 weeks:  The patient will ambulate without assistive device, independently, for community distances with normal biomechanics of the right lower extremity in order to improve mobility and allow patient to perform activities such as grocery shopping with greater ease.                          STATUS:  Not met              STG 3a: 4 weeks: The patient will ambulate with a single tipped cane with appropriate gait mechanics.                           STATUS:  MET              TREATMENT: Gait training, aquatic therapy, therapeutic exercise, and home exercise instruction.    4. Mobility: Walking/Moving Around Functional Limitation                               LTG 4: 12 weeks:  The patient will demonstrate 25 % limitation by achieving a score of 60/80 on the LEFS.                          STATUS:  Not met              STG 4 a: 6 weeks:  The patient will demonstrate 50 % limitation by achieving a score of 40/80 on the LEFS.                            STATUS:  Not met              TREATMENT:  Manual therapy, therapeutic exercise, home exercise instruction, and modalities as needed to include: moist heat, electrical stimulation, and ultrasound.        Kurt King PT  Physical Therapist    Electronically signed 3/25/2022    [place] License: PT - [id number]